# Patient Record
Sex: MALE | Race: WHITE | NOT HISPANIC OR LATINO | Employment: OTHER | URBAN - METROPOLITAN AREA
[De-identification: names, ages, dates, MRNs, and addresses within clinical notes are randomized per-mention and may not be internally consistent; named-entity substitution may affect disease eponyms.]

---

## 2017-01-03 ENCOUNTER — TELEPHONE (OUTPATIENT)
Dept: UROLOGY | Facility: CLINIC | Age: 72
End: 2017-01-03

## 2017-01-03 DIAGNOSIS — R97.20 ELEVATED PROSTATE SPECIFIC ANTIGEN (PSA): Primary | ICD-10-CM

## 2017-01-03 RX ORDER — FINASTERIDE 5 MG/1
5 TABLET, FILM COATED ORAL DAILY
Qty: 90 TABLET | Refills: 2 | Status: SHIPPED | OUTPATIENT
Start: 2017-01-03 | End: 2017-09-25

## 2017-01-03 NOTE — TELEPHONE ENCOUNTER
i sent an erx to [facility] in [place] on [address] for finasteride 5mg #90 with 2 refills and i wrote in the notes that pt will have to see md for further refills.  FRANK Lubin, CMA

## 2017-05-09 DIAGNOSIS — R97.20 ELEVATED PROSTATE SPECIFIC ANTIGEN (PSA): Primary | ICD-10-CM

## 2017-05-10 ENCOUNTER — OFFICE VISIT (OUTPATIENT)
Dept: UROLOGY | Facility: CLINIC | Age: 72
End: 2017-05-10
Payer: COMMERCIAL

## 2017-05-10 VITALS
WEIGHT: 230 LBS | HEART RATE: 68 BPM | HEIGHT: 69 IN | DIASTOLIC BLOOD PRESSURE: 74 MMHG | SYSTOLIC BLOOD PRESSURE: 120 MMHG | BODY MASS INDEX: 34.07 KG/M2

## 2017-05-10 DIAGNOSIS — R97.20 ELEVATED PROSTATE SPECIFIC ANTIGEN (PSA): ICD-10-CM

## 2017-05-10 DIAGNOSIS — N40.1 BENIGN PROSTATIC HYPERPLASIA WITH LOWER URINARY TRACT SYMPTOMS, UNSPECIFIED MORPHOLOGY: Primary | ICD-10-CM

## 2017-05-10 DIAGNOSIS — N40.0 BPH (BENIGN PROSTATIC HYPERTROPHY): Primary | ICD-10-CM

## 2017-05-10 LAB
ALBUMIN UR-MCNC: ABNORMAL MG/DL
APPEARANCE UR: CLEAR
BILIRUB UR QL STRIP: NEGATIVE
COLOR UR AUTO: YELLOW
GLUCOSE UR STRIP-MCNC: NEGATIVE MG/DL
HGB UR QL STRIP: NEGATIVE
KETONES UR STRIP-MCNC: NEGATIVE MG/DL
LEUKOCYTE ESTERASE UR QL STRIP: NEGATIVE
NITRATE UR QL: NEGATIVE
PH UR STRIP: 5 PH (ref 5–7)
PSA SERPL-MCNC: 3.3 NG/ML (ref 0–4)
RESIDUAL VOLUME (RV) (EXTERNAL): 20
SP GR UR STRIP: 1.02 (ref 1–1.03)
URN SPEC COLLECT METH UR: ABNORMAL
UROBILINOGEN UR STRIP-ACNC: 0.2 EU/DL (ref 0.2–1)

## 2017-05-10 PROCEDURE — 99212 OFFICE O/P EST SF 10 MIN: CPT | Mod: 25 | Performed by: UROLOGY

## 2017-05-10 PROCEDURE — 81003 URINALYSIS AUTO W/O SCOPE: CPT | Performed by: UROLOGY

## 2017-05-10 PROCEDURE — 51798 US URINE CAPACITY MEASURE: CPT | Performed by: UROLOGY

## 2017-05-10 PROCEDURE — 84153 ASSAY OF PSA TOTAL: CPT | Performed by: UROLOGY

## 2017-05-10 PROCEDURE — 36415 COLL VENOUS BLD VENIPUNCTURE: CPT | Performed by: UROLOGY

## 2017-05-10 RX ORDER — OMEGA-3 FATTY ACIDS/FISH OIL 300-1000MG
CAPSULE ORAL
COMMUNITY
Start: 2014-11-21 | End: 2017-05-10

## 2017-05-10 ASSESSMENT — PAIN SCALES - GENERAL: PAINLEVEL: MODERATE PAIN (4)

## 2017-05-10 NOTE — MR AVS SNAPSHOT
After Visit Summary   5/10/2017    Monster Olmedo    MRN: [id number]           Patient Information     Date Of Birth          1945        Visit Information        Provider Department      5/10/2017 9:20 AM Bright Mtz MD [facility] [place]        Today's Diagnoses     Benign prostatic hyperplasia with lower urinary tract symptoms, unspecified morphology    -  1    Elevated prostate specific antigen (PSA)           Follow-ups after your visit        Follow-up notes from your care team     Return in about 1 year (around 5/10/2018) for Physical Exam.      Who to contact     If you have questions or need follow up information about today's clinic visit or your schedule please contact [facility] [place] directly at [phone].  Normal or non-critical lab and imaging results will be communicated to you by Noknokerhart, letter or phone within 4 business days after the clinic has received the results. If you do not hear from us within 7 days, please contact the clinic through Noknokerhart or phone. If you have a critical or abnormal lab result, we will notify you by phone as soon as possible.  Submit refill requests through DeepRockDrive or call your pharmacy and they will forward the refill request to us. Please allow 3 business days for your refill to be completed.          Additional Information About Your Visit        MyChart Information     DeepRockDrive gives you secure access to your electronic health record. If you see a primary care provider, you can also send messages to your care team and make appointments. If you have questions, please call your primary care clinic.  If you do not have a primary care provider, please call [phone] and they will assist you.        Care EveryWhere ID     This is your Care EveryWhere ID. This could be used by other organizations to access your [place] medical records  [id number]        Your Vitals  "Were     Pulse Height BMI (Body Mass Index)             68 1.753 m (5' 9\") 33.97 kg/m2          Blood Pressure from Last 3 Encounters:   05/10/17 120/74   11/30/16 128/82   10/13/16 118/70    Weight from Last 3 Encounters:   05/10/17 104.3 kg (230 lb)   11/30/16 103.4 kg (228 lb)   10/13/16 102.5 kg (226 lb)              We Performed the Following     MEASURE POST-VOID RESIDUAL URINE/BLADDER CAPACITY, US NON-IMAGING (30853)     PSA Diag Urologic Phys [[id number]]        Primary Care Provider Office Phone # Fax #    Rico Cleveland [phone]       [facility] [address] [address]        Thank you!     Thank you for choosing [facility] [place]  for your care. Our goal is always to provide you with excellent care. Hearing back from our patients is one way we can continue to improve our services. Please take a few minutes to complete the written survey that you may receive in the mail after your visit with us. Thank you!             Your Updated Medication List - Protect others around you: Learn how to safely use, store and throw away your medicines at www.disposemymeds.org.          This list is accurate as of: 5/10/17  9:54 AM.  Always use your most recent med list.                   Brand Name Dispense Instructions for use    aspirin EC 81 MG EC tablet     90 tablet    Take 1 tablet (81 mg) by mouth daily       azelastine 0.1 % spray    ASTELIN     Spray 1 spray into both nostrils 2 times daily       clobetasol 0.05 % ointment    TEMOVATE     Apply topically 2 times daily       etanercept 25 MG vial injection kit    ENBREL     Inject 25 mg Subcutaneous once a week       finasteride 5 MG tablet    PROSCAR    90 tablet    Take 1 tablet (5 mg) by mouth daily       fish oil-omega-3 fatty acids 1000 MG capsule      Take 2 g by mouth 2 times daily       LEVITRA 20 MG tablet   Generic drug:  vardenafil      Take 20 mg by mouth daily as needed for erectile " dysfunction       metFORMIN 1000 MG tablet    GLUCOPHAGE     Take 1,000 mg by mouth 2 times daily (with meals)       Multiple vitamin Tabs      Take by mouth daily       order for DME      CPAP every night       ramipril 2.5 MG capsule    ALTACE     Take 2.5 mg by mouth daily       simvastatin 10 MG tablet    ZOCOR     Take 10 mg by mouth At Bedtime       tamsulosin 0.4 MG capsule    FLOMAX    90 capsule    Take 1 capsule (0.4 mg) by mouth daily

## 2017-05-10 NOTE — LETTER
5/10/2017      RE: Monster LIBRA Olmedo  [address]  [place] [address]       Monsterjose Olmedo is a pleasant 71-year-old male with BPH. He is currently on Flomax and finasteride and his PSA is 3.3. He had a normal MRI scan of the prostate a year ago. He would like to get off finasteride to improve his libido. Discussed effects and side effects of both drugs.  Other past medical history: Diabetes, atrial fibrillation, sleep apnea, erectile dysfunction, nonsmoker  Family history: No prostate cancer. Colon cancer  Medications: Baby aspirin, Astelin nasal spray, Temovate ointment, Enbrel, pro-scar, fish oil, Glucophage, multivitamin, Altase, Zocor, Flomax, Levitra  Allergies: Fenofibrate  Urinalysis: Normal, post void residual 20 cc             PSA: 3.3  Exam: Normal appearance, normal vital signs, alert and oriented, normocephalic, normal respirations, neuro grossly intact. Normal sphincter tone, no rectal mass or impaction, benign feeling prostate and seminal vesicles  Assessment: BPH  Plan: Discontinue finasteride, continue Flomax long-term. See me yearly for urinalysis, digital rectal exam. No further PSAs unless palpable abnormality    Bright Mtz MD

## 2017-05-10 NOTE — PROGRESS NOTES
Monster Olmedo is a pleasant 71-year-old male with BPH. He is currently on Flomax and finasteride and his PSA is 3.3. He had a normal MRI scan of the prostate a year ago. He would like to get off finasteride to improve his libido. Discussed effects and side effects of both drugs.  Other past medical history: Diabetes, atrial fibrillation, sleep apnea, erectile dysfunction, nonsmoker  Family history: No prostate cancer. Colon cancer  Medications: Baby aspirin, Astelin nasal spray, Temovate ointment, Enbrel, pro-scar, fish oil, Glucophage, multivitamin, Altase, Zocor, Flomax, Levitra  Allergies: Fenofibrate  Urinalysis: Normal, post void residual 20 cc             PSA: 3.3  Exam: Normal appearance, normal vital signs, alert and oriented, normocephalic, normal respirations, neuro grossly intact. Normal sphincter tone, no rectal mass or impaction, benign feeling prostate and seminal vesicles  Assessment: BPH  Plan: Discontinue finasteride, continue Flomax long-term. See me yearly for urinalysis, digital rectal exam. No further PSAs unless palpable abnormality

## 2017-05-11 DIAGNOSIS — N40.0 BPH (BENIGN PROSTATIC HYPERPLASIA): ICD-10-CM

## 2017-05-11 RX ORDER — TAMSULOSIN HYDROCHLORIDE 0.4 MG/1
0.4 CAPSULE ORAL DAILY
Qty: 90 CAPSULE | Refills: 3 | Status: SHIPPED | OUTPATIENT
Start: 2017-05-11 | End: 2017-07-24

## 2017-07-24 DIAGNOSIS — N40.0 BPH (BENIGN PROSTATIC HYPERPLASIA): ICD-10-CM

## 2017-07-24 RX ORDER — TAMSULOSIN HYDROCHLORIDE 0.4 MG/1
0.4 CAPSULE ORAL DAILY
Qty: 90 CAPSULE | Refills: 3 | Status: SHIPPED | OUTPATIENT
Start: 2017-07-24 | End: 2018-07-14

## 2017-09-25 DIAGNOSIS — R97.20 ELEVATED PROSTATE SPECIFIC ANTIGEN (PSA): ICD-10-CM

## 2017-09-25 RX ORDER — FINASTERIDE 5 MG/1
5 TABLET, FILM COATED ORAL DAILY
Qty: 90 TABLET | Refills: 3 | Status: SHIPPED | OUTPATIENT
Start: 2017-09-25 | End: 2021-06-30

## 2018-05-25 DIAGNOSIS — R97.20 ELEVATED PROSTATE SPECIFIC ANTIGEN (PSA): Primary | ICD-10-CM

## 2018-05-30 ENCOUNTER — OFFICE VISIT (OUTPATIENT)
Dept: UROLOGY | Facility: CLINIC | Age: 73
End: 2018-05-30
Payer: COMMERCIAL

## 2018-05-30 VITALS
BODY MASS INDEX: 33.47 KG/M2 | SYSTOLIC BLOOD PRESSURE: 142 MMHG | WEIGHT: 226 LBS | HEIGHT: 69 IN | DIASTOLIC BLOOD PRESSURE: 64 MMHG | OXYGEN SATURATION: 97 % | HEART RATE: 90 BPM

## 2018-05-30 DIAGNOSIS — R39.15 URINARY URGENCY: Primary | ICD-10-CM

## 2018-05-30 DIAGNOSIS — R97.20 ELEVATED PROSTATE SPECIFIC ANTIGEN (PSA): ICD-10-CM

## 2018-05-30 LAB
ALBUMIN UR-MCNC: ABNORMAL MG/DL
APPEARANCE UR: CLEAR
BILIRUB UR QL STRIP: NEGATIVE
COLOR UR AUTO: YELLOW
GLUCOSE UR STRIP-MCNC: 250 MG/DL
HGB UR QL STRIP: NEGATIVE
KETONES UR STRIP-MCNC: NEGATIVE MG/DL
LEUKOCYTE ESTERASE UR QL STRIP: NEGATIVE
NITRATE UR QL: NEGATIVE
PH UR STRIP: 5 PH (ref 5–7)
SOURCE: ABNORMAL
SP GR UR STRIP: >1.03 (ref 1–1.03)
UROBILINOGEN UR STRIP-ACNC: 0.2 EU/DL (ref 0.2–1)

## 2018-05-30 PROCEDURE — 51798 US URINE CAPACITY MEASURE: CPT | Performed by: UROLOGY

## 2018-05-30 PROCEDURE — 81003 URINALYSIS AUTO W/O SCOPE: CPT | Performed by: UROLOGY

## 2018-05-30 PROCEDURE — 99213 OFFICE O/P EST LOW 20 MIN: CPT | Mod: 25 | Performed by: UROLOGY

## 2018-05-30 RX ORDER — FLUTICASONE PROPIONATE 50 MCG
SPRAY, SUSPENSION (ML) NASAL
Refills: 5 | COMMUNITY
Start: 2018-03-05

## 2018-05-30 RX ORDER — RAMIPRIL 5 MG/1
CAPSULE ORAL
Refills: 3 | COMMUNITY
Start: 2018-04-17 | End: 2021-06-30

## 2018-05-30 RX ORDER — SILDENAFIL CITRATE 20 MG/1
TABLET ORAL
Status: ON HOLD | COMMUNITY
Start: 2018-01-18 | End: 2022-08-17

## 2018-05-30 RX ORDER — BETAMETHASONE DIPROPIONATE 0.5 MG/G
CREAM TOPICAL
Refills: 0 | COMMUNITY
Start: 2017-10-17

## 2018-05-30 ASSESSMENT — PAIN SCALES - GENERAL: PAINLEVEL: NO PAIN (0)

## 2018-05-30 NOTE — PROGRESS NOTES
Monster is a 72-year-old male with BPH. Patient has been evaluated and has no evidence of prostate cancer. He has a normal urinalysis and a 60 cc postvoid residual. There is no family history of prostate cancer.  Review of systems: No dysuria or hematuria. He has urgency, rare urge incontinence-only drops, post void dribbling.  Drinks a lot of coffee in the morning, some chocolate, occasional alcohol-notices frequency after each  Other past medical history: Diabetes, atrial fibrillation, sleep apnea, nonsmoker  Medications: Low-dose aspirin, Astelin nasal spray, 2% cream, Temovate ointment, Enbrel, Proscar, fish oil, Flonase spray, metformin, multivitamin, all TACE, sildenafil, Zocor, Flomax, Levitra  Allergies: Fenofibrate  Exam: Normal appearance, normal vital signs. Alert and oriented, normocephalic, normal respirations, neuro grossly intact. Normal sphincter tone, no rectal mass or impaction, benign feeling prostate, normal seminal vesicles. Abdomen obese, soft and nontender  Assessment: Urinary frequency and urgency discussed. Patient is voiding comfortably and can stop finasteride. Continue Flomax long-term. Discussed effects of caffeine, alcohol and chocolate on kidneys and bladder  Plan: See me next year for HUYEN, urinalysis and postvoid residual

## 2018-05-30 NOTE — NURSING NOTE
Chief Complaint   Patient presents with     Clinic Care Coordination - Follow-up     Pt here for annual follow-up     Ashlyn Luong CMA

## 2018-05-30 NOTE — MR AVS SNAPSHOT
"              After Visit Summary   5/30/2018    Monster Olmedo    MRN: [id number]           Patient Information     Date Of Birth          1945        Visit Information        Provider Department      5/30/2018 11:20 AM Bright Mtz MD [facility] [place]        Today's Diagnoses     Urinary urgency    -  1       Follow-ups after your visit        Follow-up notes from your care team     Return in about 1 year (around 5/30/2019) for Physical Exam.      Who to contact     If you have questions or need follow up information about today's clinic visit or your schedule please contact [facility] directly at [phone].  Normal or non-critical lab and imaging results will be communicated to you by Vantrixhart, letter or phone within 4 business days after the clinic has received the results. If you do not hear from us within 7 days, please contact the clinic through Vantrixhart or phone. If you have a critical or abnormal lab result, we will notify you by phone as soon as possible.  Submit refill requests through [employer] or call your pharmacy and they will forward the refill request to us. Please allow 3 business days for your refill to be completed.          Additional Information About Your Visit        MyChart Information     [employer] gives you secure access to your electronic health record. If you see a primary care provider, you can also send messages to your care team and make appointments. If you have questions, please call your primary care clinic.  If you do not have a primary care provider, please call [phone] and they will assist you.        Care EveryWhere ID     This is your Care EveryWhere ID. This could be used by other organizations to access your [place] medical records  [id number]        Your Vitals Were     Pulse Height Pulse Oximetry BMI (Body Mass Index)          90 1.753 m (5' 9\") 97% 33.37 kg/m2         Blood " Pressure from Last 3 Encounters:   05/30/18 142/64   05/10/17 120/74   11/30/16 128/82    Weight from Last 3 Encounters:   05/30/18 102.5 kg (226 lb)   05/10/17 104.3 kg (230 lb)   11/30/16 103.4 kg (228 lb)              We Performed the Following     MEASURE POST-VOID RESIDUAL URINE/BLADDER CAPACITY, US NON-IMAGING (06977)        Primary Care Provider Office Phone # Fax #    Rico Cleveland [phone]       [facility] [address] [place] [address]        Equal Access to Services     [facility]: Hadii alexander Sorto, waaxda lujake, qaybta kaalmada flores, graciela adam . So [facility] [phone].    ATENCIÓN: Si habla español, tiene a marvin disposición servicios gratuitos de asistencia lingüística. [facility] [phone].    We comply with applicable federal civil rights laws and [place] laws. We do not discriminate on the basis of race, color, national origin, age, disability, sex, sexual orientation, or gender identity.            Thank you!     Thank you for choosing [facility] [place]  for your care. Our goal is always to provide you with excellent care. Hearing back from our patients is one way we can continue to improve our services. Please take a few minutes to complete the written survey that you may receive in the mail after your visit with us. Thank you!             Your Updated Medication List - Protect others around you: Learn how to safely use, store and throw away your medicines at www.disposemymeds.org.          This list is accurate as of 5/30/18 12:02 PM.  Always use your most recent med list.                   Brand Name Dispense Instructions for use Diagnosis    aspirin 81 MG EC tablet     90 tablet    Take 1 tablet (81 mg) by mouth daily    Near syncope       azelastine 0.1 % spray    ASTELIN     Spray 1 spray into both nostrils 2 times daily        betamethasone dipropionate 0.05 % cream    DIPROSONE      APPLY TOPICALLY 2 TIMES EVERY DAY A THIN LAYER TO THE AFFECTED AREA(S)        clobetasol 0.05 % ointment    TEMOVATE     Apply topically 2 times daily        etanercept 25 MG vial injection kit    ENBREL     Inject 25 mg Subcutaneous once a week        finasteride 5 MG tablet    PROSCAR    90 tablet    Take 1 tablet (5 mg) by mouth daily    Elevated prostate specific antigen (PSA)       fish oil-omega-3 fatty acids 1000 MG capsule      Take 2 g by mouth 2 times daily        fluticasone 50 MCG/ACT spray    FLONASE     INHALE 1 (50MCG) BY INTRANASAL ROUTE EVERY DAY IN EACH NOSTRIL        LEVITRA 20 MG tablet   Generic drug:  vardenafil      Take 20 mg by mouth daily as needed for erectile dysfunction        metFORMIN 1000 MG tablet    GLUCOPHAGE     Take 1,000 mg by mouth 2 times daily (with meals)        Multiple vitamin Tabs      Take by mouth daily        order for DME      CPAP every night        * ramipril 2.5 MG capsule    ALTACE     Take 2.5 mg by mouth daily        * ramipril 5 MG capsule    ALTACE     TAKE 1 CAPSULE BY MOUTH ONCE DAILY.        sildenafil 20 MG tablet    REVATIO     Take 2 tabs 1 hr before sexual intercourse.        simvastatin 10 MG tablet    ZOCOR     Take 10 mg by mouth At Bedtime        tamsulosin 0.4 MG capsule    FLOMAX    90 capsule    Take 1 capsule (0.4 mg) by mouth daily    BPH (benign prostatic hyperplasia)       * Notice:  This list has 2 medication(s) that are the same as other medications prescribed for you. Read the directions carefully, and ask your doctor or other care provider to review them with you.

## 2018-05-30 NOTE — LETTER
5/30/2018     RE: Monster Olmedo  [address]  [place] [address]     Dear Colleague,    Thank you for referring your patient, Monster Olmedo, to the [facility] [place] at [facility]. Please see a copy of my visit note below.    Monster is a 72-year-old male with BPH. Patient has been evaluated and has no evidence of prostate cancer. He has a normal urinalysis and a 60 cc postvoid residual. There is no family history of prostate cancer.  Review of systems: No dysuria or hematuria. He has urgency, rare urge incontinence-only drops, post void dribbling.  Drinks a lot of coffee in the morning, some chocolate, occasional alcohol-notices frequency after each  Other past medical history: Diabetes, atrial fibrillation, sleep apnea, nonsmoker  Medications: Low-dose aspirin, Astelin nasal spray, 2% cream, Temovate ointment, Enbrel, Proscar, fish oil, Flonase spray, metformin, multivitamin, all TACE, sildenafil, Zocor, Flomax, Levitra  Allergies: Fenofibrate  Exam: Normal appearance, normal vital signs. Alert and oriented, normocephalic, normal respirations, neuro grossly intact. Normal sphincter tone, no rectal mass or impaction, benign feeling prostate, normal seminal vesicles. Abdomen obese, soft and nontender  Assessment: Urinary frequency and urgency discussed. Patient is voiding comfortably and can stop finasteride. Continue Flomax long-term. Discussed effects of caffeine, alcohol and chocolate on kidneys and bladder  Plan: See me next year for HUYEN, urinalysis and postvoid residual    Again, thank you for allowing me to participate in the care of your patient.      Sincerely,    Bright Mtz MD

## 2018-06-11 ENCOUNTER — HOSPITAL ENCOUNTER (EMERGENCY)
Facility: CLINIC | Age: 73
Discharge: HOME | End: 2018-06-11
Attending: EMERGENCY MEDICINE | Admitting: EMERGENCY MEDICINE
Payer: MEDICARE

## 2018-06-11 ENCOUNTER — APPOINTMENT (OUTPATIENT)
Dept: CT IMAGING | Facility: CLINIC | Age: 73
End: 2018-06-11
Attending: EMERGENCY MEDICINE
Payer: MEDICARE

## 2018-06-11 VITALS
HEIGHT: 69 IN | WEIGHT: 230 LBS | BODY MASS INDEX: 34.07 KG/M2 | SYSTOLIC BLOOD PRESSURE: 130 MMHG | TEMPERATURE: 97.6 F | OXYGEN SATURATION: 98 % | DIASTOLIC BLOOD PRESSURE: 80 MMHG | RESPIRATION RATE: 9 BRPM

## 2018-06-11 DIAGNOSIS — K80.50 BILIARY COLIC: ICD-10-CM

## 2018-06-11 LAB
ALBUMIN SERPL-MCNC: 4 G/DL (ref 3.4–5)
ALBUMIN UR-MCNC: 10 MG/DL
ALP SERPL-CCNC: 73 U/L (ref 40–150)
ALT SERPL W P-5'-P-CCNC: 49 U/L (ref 0–70)
ANION GAP SERPL CALCULATED.3IONS-SCNC: 6 MMOL/L (ref 3–14)
APPEARANCE UR: CLEAR
AST SERPL W P-5'-P-CCNC: 25 U/L (ref 0–45)
BASOPHILS # BLD AUTO: 0 10E9/L (ref 0–0.2)
BASOPHILS NFR BLD AUTO: 0.4 %
BILIRUB SERPL-MCNC: 0.8 MG/DL (ref 0.2–1.3)
BILIRUB UR QL STRIP: NEGATIVE
BUN SERPL-MCNC: 18 MG/DL (ref 7–30)
CALCIUM SERPL-MCNC: 9.2 MG/DL (ref 8.5–10.1)
CHLORIDE SERPL-SCNC: 103 MMOL/L (ref 94–109)
CO2 SERPL-SCNC: 32 MMOL/L (ref 20–32)
COLOR UR AUTO: ABNORMAL
CREAT BLD-MCNC: 0.9 MG/DL (ref 0.66–1.25)
CREAT SERPL-MCNC: 0.9 MG/DL (ref 0.66–1.25)
DIFFERENTIAL METHOD BLD: NORMAL
EOSINOPHIL # BLD AUTO: 0.2 10E9/L (ref 0–0.7)
EOSINOPHIL NFR BLD AUTO: 3 %
ERYTHROCYTE [DISTWIDTH] IN BLOOD BY AUTOMATED COUNT: 12.7 % (ref 10–15)
GFR SERPL CREATININE-BSD FRML MDRD: 83 ML/MIN/1.7M2
GFR SERPL CREATININE-BSD FRML MDRD: 83 ML/MIN/1.7M2
GLUCOSE SERPL-MCNC: 163 MG/DL (ref 70–99)
GLUCOSE UR STRIP-MCNC: NEGATIVE MG/DL
HCT VFR BLD AUTO: 43.1 % (ref 40–53)
HGB BLD-MCNC: 15.1 G/DL (ref 13.3–17.7)
HGB UR QL STRIP: NEGATIVE
IMM GRANULOCYTES # BLD: 0 10E9/L (ref 0–0.4)
IMM GRANULOCYTES NFR BLD: 0.4 %
INTERPRETATION ECG - MUSE: NORMAL
KETONES UR STRIP-MCNC: NEGATIVE MG/DL
LEUKOCYTE ESTERASE UR QL STRIP: NEGATIVE
LIPASE SERPL-CCNC: 199 U/L (ref 73–393)
LYMPHOCYTES # BLD AUTO: 3.3 10E9/L (ref 0.8–5.3)
LYMPHOCYTES NFR BLD AUTO: 42.7 %
MCH RBC QN AUTO: 31 PG (ref 26.5–33)
MCHC RBC AUTO-ENTMCNC: 35 G/DL (ref 31.5–36.5)
MCV RBC AUTO: 89 FL (ref 78–100)
MONOCYTES # BLD AUTO: 1 10E9/L (ref 0–1.3)
MONOCYTES NFR BLD AUTO: 13.4 %
NEUTROPHILS # BLD AUTO: 3.1 10E9/L (ref 1.6–8.3)
NEUTROPHILS NFR BLD AUTO: 40.1 %
NITRATE UR QL: NEGATIVE
NRBC # BLD AUTO: 0 10*3/UL
NRBC BLD AUTO-RTO: 0 /100
PH UR STRIP: 6 PH (ref 5–7)
PLATELET # BLD AUTO: 216 10E9/L (ref 150–450)
POTASSIUM SERPL-SCNC: 3.7 MMOL/L (ref 3.4–5.3)
PROT SERPL-MCNC: 7.7 G/DL (ref 6.8–8.8)
RBC # BLD AUTO: 4.87 10E12/L (ref 4.4–5.9)
RBC #/AREA URNS AUTO: <1 /HPF (ref 0–2)
SODIUM SERPL-SCNC: 141 MMOL/L (ref 133–144)
SOURCE: ABNORMAL
SP GR UR STRIP: 1.01 (ref 1–1.03)
SQUAMOUS #/AREA URNS AUTO: <1 /HPF (ref 0–1)
TROPONIN I SERPL-MCNC: <0.015 UG/L (ref 0–0.04)
UROBILINOGEN UR STRIP-MCNC: NORMAL MG/DL (ref 0–2)
WBC # BLD AUTO: 7.7 10E9/L (ref 4–11)
WBC #/AREA URNS AUTO: 0 /HPF (ref 0–5)

## 2018-06-11 PROCEDURE — 25000125 ZZHC RX 250: Performed by: EMERGENCY MEDICINE

## 2018-06-11 PROCEDURE — 74177 CT ABD & PELVIS W/CONTRAST: CPT

## 2018-06-11 PROCEDURE — 96374 THER/PROPH/DIAG INJ IV PUSH: CPT | Mod: 59

## 2018-06-11 PROCEDURE — 82565 ASSAY OF CREATININE: CPT

## 2018-06-11 PROCEDURE — 25000128 H RX IP 250 OP 636: Performed by: EMERGENCY MEDICINE

## 2018-06-11 PROCEDURE — 85025 COMPLETE CBC W/AUTO DIFF WBC: CPT | Performed by: EMERGENCY MEDICINE

## 2018-06-11 PROCEDURE — 93005 ELECTROCARDIOGRAM TRACING: CPT

## 2018-06-11 PROCEDURE — 83690 ASSAY OF LIPASE: CPT | Performed by: EMERGENCY MEDICINE

## 2018-06-11 PROCEDURE — 96361 HYDRATE IV INFUSION ADD-ON: CPT

## 2018-06-11 PROCEDURE — 84484 ASSAY OF TROPONIN QUANT: CPT | Performed by: EMERGENCY MEDICINE

## 2018-06-11 PROCEDURE — 25000132 ZZH RX MED GY IP 250 OP 250 PS 637: Performed by: EMERGENCY MEDICINE

## 2018-06-11 PROCEDURE — 99285 EMERGENCY DEPT VISIT HI MDM: CPT | Mod: 25

## 2018-06-11 PROCEDURE — 80053 COMPREHEN METABOLIC PANEL: CPT | Performed by: EMERGENCY MEDICINE

## 2018-06-11 PROCEDURE — 96375 TX/PRO/DX INJ NEW DRUG ADDON: CPT

## 2018-06-11 PROCEDURE — A9270 NON-COVERED ITEM OR SERVICE: HCPCS | Mod: GY | Performed by: EMERGENCY MEDICINE

## 2018-06-11 PROCEDURE — 81001 URINALYSIS AUTO W/SCOPE: CPT | Performed by: EMERGENCY MEDICINE

## 2018-06-11 RX ORDER — ONDANSETRON 4 MG/1
4 TABLET, ORALLY DISINTEGRATING ORAL EVERY 6 HOURS PRN
Qty: 15 TABLET | Refills: 0 | Status: SHIPPED | OUTPATIENT
Start: 2018-06-11 | End: 2021-06-30

## 2018-06-11 RX ORDER — IOPAMIDOL 755 MG/ML
115 INJECTION, SOLUTION INTRAVASCULAR ONCE
Status: COMPLETED | OUTPATIENT
Start: 2018-06-11 | End: 2018-06-11

## 2018-06-11 RX ORDER — OXYCODONE HYDROCHLORIDE 5 MG/1
5 TABLET ORAL ONCE
Status: COMPLETED | OUTPATIENT
Start: 2018-06-11 | End: 2018-06-11

## 2018-06-11 RX ORDER — ONDANSETRON 2 MG/ML
4 INJECTION INTRAMUSCULAR; INTRAVENOUS
Status: DISCONTINUED | OUTPATIENT
Start: 2018-06-11 | End: 2018-06-11 | Stop reason: HOSPADM

## 2018-06-11 RX ORDER — HYDROMORPHONE HYDROCHLORIDE 1 MG/ML
0.5 INJECTION, SOLUTION INTRAMUSCULAR; INTRAVENOUS; SUBCUTANEOUS
Status: DISCONTINUED | OUTPATIENT
Start: 2018-06-11 | End: 2018-06-11 | Stop reason: HOSPADM

## 2018-06-11 RX ORDER — OXYCODONE HYDROCHLORIDE 5 MG/1
5 TABLET ORAL EVERY 6 HOURS PRN
Qty: 10 TABLET | Refills: 0 | Status: SHIPPED | OUTPATIENT
Start: 2018-06-11 | End: 2021-06-30

## 2018-06-11 RX ADMIN — SODIUM CHLORIDE 75 ML: 9 INJECTION, SOLUTION INTRAVENOUS at 03:09

## 2018-06-11 RX ADMIN — LIDOCAINE HYDROCHLORIDE 30 ML: 20 SOLUTION ORAL; TOPICAL at 04:06

## 2018-06-11 RX ADMIN — OXYCODONE HYDROCHLORIDE 5 MG: 5 TABLET ORAL at 04:53

## 2018-06-11 RX ADMIN — ONDANSETRON 4 MG: 2 INJECTION INTRAMUSCULAR; INTRAVENOUS at 03:23

## 2018-06-11 RX ADMIN — SODIUM CHLORIDE 1000 ML: 9 INJECTION, SOLUTION INTRAVENOUS at 03:19

## 2018-06-11 RX ADMIN — IOPAMIDOL 115 ML: 755 INJECTION, SOLUTION INTRAVENOUS at 03:09

## 2018-06-11 RX ADMIN — HYDROMORPHONE HYDROCHLORIDE 0.5 MG: 1 INJECTION, SOLUTION INTRAMUSCULAR; INTRAVENOUS; SUBCUTANEOUS at 03:19

## 2018-06-11 NOTE — ED PROVIDER NOTES
History     Chief Complaint:  Abdominal Pain     HPI   Monster Olmedo is a 72 year old male who presents for evaluation of upper abdominal pain.  He was feeling fine when he went to bed around 11 PM, though about a half hour later while still awake he noticed the onset of bilateral upper abdominal pain with slight radiation to his back.  No nausea, vomiting, or changes in bowel movements recently.  No urinary symptoms.  No history of abdominal surgeries.  He is not on blood thinners though does have a history of paroxysmal atrial fibrillation.  His history of non-insulin-dependent diabetes.  No recent fevers or abdominal trauma.  No chest pain, trouble breathing, or cough.  He thinks the problem is in his abdomen and not his chest.  No lateralizing symptoms.  No history of gallbladder problems..    Allergies:  Fenofibrate     Medications:      ondansetron (ZOFRAN ODT) 4 MG ODT tab   oxyCODONE IR (ROXICODONE) 5 MG tablet   aspirin EC 81 MG tablet   azelastine (ASTELIN) 0.1 % nasal spray   betamethasone dipropionate (DIPROSONE) 0.05 % cream   clobetasol (TEMOVATE) 0.05 % ointment   etanercept (ENBREL) 25 MG vial injection kit   finasteride (PROSCAR) 5 MG tablet   fish oil-omega-3 fatty acids 1000 MG capsule   fluticasone (FLONASE) 50 MCG/ACT spray   metFORMIN (GLUCOPHAGE) 1000 MG tablet   Multiple vitamin TABS   order for DME   ramipril (ALTACE) 2.5 MG capsule   ramipril (ALTACE) 5 MG capsule   sildenafil (REVATIO) 20 MG tablet   simvastatin (ZOCOR) 10 MG tablet   tamsulosin (FLOMAX) 0.4 MG capsule   vardenafil (LEVITRA) 20 MG tablet       Past Medical History:    Past Medical History:   Diagnosis Date     Diabetes (H)      Paroxysmal atrial fibrillation (H) 9/23/16     Sleep apnea        There are no active problems to display for this patient.       Past Surgical History:    Past Surgical History:   Procedure Laterality Date     HAND SURGERY Left      PROSTATE SURGERY          Family History:    family history  "includes CEREBROVASCULAR DISEASE in his mother; Familial Adenomatous Polyposis (FAP) in his father; Unknown/Adopted in his maternal grandfather, maternal grandmother, paternal grandfather, and paternal grandmother.    Social History:   reports that he has never smoked. He has never used smokeless tobacco. He reports that he drinks alcohol. He reports that he does not use illicit drugs.    PCP: Rico Cleveland     Review of Systems   All other systems reviewed and are negative.    Physical Exam     Patient Vitals for the past 24 hrs:   BP Temp Temp src Heart Rate Resp SpO2 Height Weight   06/11/18 0526 130/80 - - 76 - 98 % - -   06/11/18 0345 - - - 64 9 97 % - -   06/11/18 0330 138/76 - - 59 (!) 7 95 % - -   06/11/18 0240 161/69 97.6  F (36.4  C) Oral 63 16 98 % 1.753 m (5' 9\") 104.3 kg (230 lb)        Physical Exam  General: Male sitting upright in room 24, female  at bedside  HENT: mucous membranes moist   CV: regular rate, regular rhythm, no murmur audible, no abnormal abdominal pulsations palpable though exam limited somewhat by habitus, no LE edema, normal radial pulses, all 4 extremities appropriately perfused  Resp: clear throughout, normal effort, no crackles or wheezing  GI: abdomen soft, protuberant, moderate tenderness mostly in bilateral upper quadrants, negative Russ sign, no discrete masses palpable, no focal tenderness  MSK: no bony tenderness, no CVAT  Skin: appropriately warm and dry, no abdominal/flank/back rash or ecchymosis  Neuro: alert, clear speech, oriented  Psych: normal mood and affect    Emergency Department Course     ECG (02:45:41):  Rate 64 bpm.   QT/QTc 396.   QRS 94  Sinus rhythm with first-degree AV block, possible prior septal infarct.  interpreted at 0303 by Handy Lynch MD.     Imaging:    CT Abdomen Pelvis w Contrast   Preliminary Result   IMPRESSION:    1. No cause of acute pain identified in the abdomen or pelvis.   2. Cholelithiasis.            All imaging " results were discussed with the  who voiced understanding of the findings.    Laboratory:    Labs Ordered and Resulted from Time of ED Arrival Up to the Time of Departure from the ED   COMPREHENSIVE METABOLIC PANEL - Abnormal; Notable for the following:        Result Value    Glucose 163 (*)     All other components within normal limits   ROUTINE UA WITH MICROSCOPIC - Abnormal; Notable for the following:     Protein Albumin Urine 10 (*)     All other components within normal limits   CBC WITH PLATELETS DIFFERENTIAL   LIPASE   TROPONIN I   CREATININE POCT   PERIPHERAL IV CATHETER   CARDIAC CONTINUOUS MONITORING   PULSE OXIMETRY NURSING   ISTAT CREATININE NURSING POCT        Interventions:    Medications   HYDROmorphone (PF) (DILAUDID) injection 0.5 mg (0.5 mg Intravenous Given 6/11/18 0319)   ondansetron (ZOFRAN) injection 4 mg (4 mg Intravenous Given 6/11/18 0323)   iopamidol (ISOVUE-370) solution 115 mL (115 mLs Intravenous Given 6/11/18 0309)   CT scan flush (75 mLs Intravenous Given 6/11/18 0309)   0.9% sodium chloride BOLUS (0 mLs Intravenous Stopped 6/11/18 0523)   lidocaine (viscous) (XYLOCAINE) 2 % 15 mL, alum & mag hydroxide-simethicone (MYLANTA ES/MAALOX  ES) 15 mL GI Cocktail (30 mLs Oral Given 6/11/18 0406)   oxyCODONE IR (ROXICODONE) tablet 5 mg (5 mg Oral Given 6/11/18 0453)        Emergency Department Course:  Past medical records, nursing notes, and vitals reviewed.  I performed an exam of the patient and obtained history, as documented above.    I performed electronic chart review in EPIC.    I rechecked the patient at 04 43 and then again just prior to discharge.  Serial abdominal exams remain benign.  Symptoms resolved.  He passed oral challenge.. Findings and plan explained to the Patient and . Patient was discharged.    Impression & Plan      Medical Decision Making:  I think his acute upper abdominal pain is most likely due to biliary colic.  In retrospect, he states dinner tonight was  "bigger than usual he may have had symptoms somewhat like this in the past thogh less intense.  Alternate causes including bowel obstruction, pancreatitis, hepatitis, kidney stone, and referred pain from lower thoracic condition such as ACS and pulmonary embolism were all considered but I think they are ruled out to sufficient degree that discharge was reasonable.  I do not think he requires dedicated gallbladder ultrasound.  Symptoms improved and he is passing oral challenge.  I offered hospitalization for symptom control which he declined.  Likely need for eventual surgery to address his gallstones was discussed and referral information for our general surgery group was provided.  I discussed potential side effects of medications prescribed including drowsiness and constipation.  Specific work return precautions for high fevers, unbearable pain, prolonged vomiting, or other concerns were reviewed.  Dietary guidance provided as well to minimize the risk of recurrent attacks.  He was discharged home in improved condition.      Diagnosis:    ICD-10-CM    1. Biliary colic K80.50         Discharge Medications:  Discharge Medication List as of 6/11/2018  5:24 AM      START taking these medications    Details   ondansetron (ZOFRAN ODT) 4 MG ODT tab Take 1 tablet (4 mg) by mouth every 6 hours as needed for nausea, Disp-15 tablet, R-0, Local Print      oxyCODONE IR (ROXICODONE) 5 MG tablet Take 1 tablet (5 mg) by mouth every 6 hours as needed for severe pain, Disp-10 tablet, R-0, Local Print              This record was created at least in part using electronic voice recognition software, so please excuse any \"typos.\"    6/11/2018   Handy Lynch, *        Handy Lynch MD  06/11/18 0544    "

## 2018-06-11 NOTE — DISCHARGE INSTRUCTIONS
Gallstones with Biliary Colic    You have abdominal pain due to irritation and spasm of the gallbladder. This is called biliary colic. The gallbladder is a small sac under the liver, which stores and releases a fluid that aids in the digestion of fat. A collection of crystals may form stones inside the gallbladder (gallstones). Gallstones can cause the gallbladder to spasm. If they block the duct out of the gallbladder, they can cause pain and even an infection.   A number of factors increase the risk for having gallstones:    Being female    Being severely overweight (obese)    Older age    Losing or gaining weight quickly    Eating a high-calorie diet    Being pregnant    Taking hormone therapy    Having diabetes  Home care    Rest in bed.    Drink only clear liquids until you feel better.    You may have been prescribed medicine for pain or nausea. Take these as directed.    Fat in your diet makes the gallbladder contract and may cause increased pain. Avoid foods that are high in fat (such as full-fat dairy, fried foods, and fatty meats) for at least two days.    If you are overweight, talk to your healthcare provider about losing weight.  Follow-up care  Follow up with your healthcare provider or as advised. There is a chance that you will have another episode of pain from your gallstones at some point. Removal of the gallbladder is an option to prevent this. Talk with your healthcare provider about your treatment options.  When to seek medical advice  Call your healthcare provider if any of the following occur:    Worsening pain or pain lasting for longer than 6 hours    Pain moving to the right lower abdomen    Repeated vomiting    Swollen abdomen    Fever of 100.4 F (38 C) or higher, or as directed by your healthcare provider    Very dark urine, light colored stools, or yellow color of the skin or eyes    Chest, arm, back, neck or jaw pain  Date Last Reviewed: 6/18/2015 2000-2017 [employer] [address] [place] [address]. All rights reserved. This information is not intended as a substitute for professional medical care. Always follow your healthcare professional's instructions.

## 2018-06-11 NOTE — ED AVS SNAPSHOT
Emergency Department    [address] [place] [address]    Phone:  [phone]    Fax:  [phone]                                       Monster LIBRA Briannasteve   MRN: [id number]    Department:   Emergency Department   Date of Visit:  6/11/2018           After Visit Summary Signature Page     I have received my discharge instructions, and my questions have been answered. I have discussed any challenges I see with this plan with the nurse or doctor.    ..........................................................................................................................................  Patient/Patient Representative Signature      ..........................................................................................................................................  Patient Representative Print Name and Relationship to Patient    ..................................................               ................................................  Date                                            Time    ..........................................................................................................................................  Reviewed by Signature/Title    ...................................................              ..............................................  Date                                                            Time

## 2018-06-11 NOTE — ED AVS SNAPSHOT
Emergency Department    [address] [facility] [address]    Phone:  [phone]    Fax:  [phone]                                       Monster LIBRA Olmedo   MRN: [id number]    Department:   Emergency Department   Date of Visit:  6/11/2018           Patient Information     Date Of Birth          1945        Your diagnoses for this visit were:     Biliary colic        You were seen by Handy Lynch MD.      Follow-up Information     Follow up with [facility]. Call today.    Why:  to make appt for recheck with General Surgeon to discuss possible gallbladder removal surgery    Contact information:    [address]  [facility] [address] [phone]        Follow up with Rico Cleveland MD. Call today.    Contact information:    [facility]  [address]  [facility] [id number] [phone]          Follow up with  Emergency Department.    Specialty:  EMERGENCY MEDICINE    Why:  As needed, If symptoms worsen    Contact information:    [address] [facility] [address] [phone]        Discharge Instructions         Gallstones with Biliary Colic    You have abdominal pain due to irritation and spasm of the gallbladder. This is called biliary colic. The gallbladder is a small sac under the liver, which stores and releases a fluid that aids in the digestion of fat. A collection of crystals may form stones inside the gallbladder (gallstones). Gallstones can cause the gallbladder to spasm. If they block the duct out of the gallbladder, they can cause pain and even an infection.   A number of factors increase the risk for having gallstones:    Being female    Being severely overweight (obese)    Older age    Losing or gaining weight quickly    Eating a high-calorie diet    Being pregnant    Taking hormone therapy    Having diabetes  Home care    Rest in bed.    Drink only clear liquids until you feel better.    You may  have been prescribed medicine for pain or nausea. Take these as directed.    Fat in your diet makes the gallbladder contract and may cause increased pain. Avoid foods that are high in fat (such as full-fat dairy, fried foods, and fatty meats) for at least two days.    If you are overweight, talk to your healthcare provider about losing weight.  Follow-up care  Follow up with your healthcare provider or as advised. There is a chance that you will have another episode of pain from your gallstones at some point. Removal of the gallbladder is an option to prevent this. Talk with your healthcare provider about your treatment options.  When to seek medical advice  Call your healthcare provider if any of the following occur:    Worsening pain or pain lasting for longer than 6 hours    Pain moving to the right lower abdomen    Repeated vomiting    Swollen abdomen    Fever of 100.4 F (38 C) or higher, or as directed by your healthcare provider    Very dark urine, light colored stools, or yellow color of the skin or eyes    Chest, arm, back, neck or jaw pain  Date Last Reviewed: 6/18/2015 2000-2017 [employer]. [address] [place] [address]. All rights reserved. This information is not intended as a substitute for professional medical care. Always follow your healthcare professional's instructions.          24 Hour Appointment Hotline       To make an appointment at any [facility] clinic, call [phone] ([phone]). If you don't have a family doctor or clinic, we will help you find one. [facility] clinics are conveniently located to serve the needs of you and your family.             Review of your medicines      START taking        Dose / Directions Last dose taken    ondansetron 4 MG ODT tab   Commonly known as:  ZOFRAN ODT   Dose:  4 mg   Quantity:  15 tablet        Take 1 tablet (4 mg) by mouth every 6 hours as needed for nausea   Refills:  0        oxyCODONE IR 5 MG tablet   Commonly  known as:  ROXICODONE   Dose:  5 mg   Quantity:  10 tablet        Take 1 tablet (5 mg) by mouth every 6 hours as needed for severe pain   Refills:  0          Our records show that you are taking the medicines listed below. If these are incorrect, please call your family doctor or clinic.        Dose / Directions Last dose taken    aspirin 81 MG EC tablet   Dose:  81 mg   Quantity:  90 tablet        Take 1 tablet (81 mg) by mouth daily   Refills:  0        azelastine 0.1 % spray   Commonly known as:  ASTELIN   Dose:  1 spray        Spray 1 spray into both nostrils 2 times daily   Refills:  0        betamethasone dipropionate 0.05 % cream   Commonly known as:  DIPROSONE        APPLY TOPICALLY 2 TIMES EVERY DAY A THIN LAYER TO THE AFFECTED AREA(S)   Refills:  0        clobetasol 0.05 % ointment   Commonly known as:  TEMOVATE        Apply topically 2 times daily   Refills:  0        etanercept 25 MG vial injection kit   Commonly known as:  ENBREL   Dose:  25 mg        Inject 25 mg Subcutaneous once a week   Refills:  0        finasteride 5 MG tablet   Commonly known as:  PROSCAR   Dose:  5 mg   Quantity:  90 tablet        Take 1 tablet (5 mg) by mouth daily   Refills:  3        fish oil-omega-3 fatty acids 1000 MG capsule   Dose:  2 g        Take 2 g by mouth 2 times daily   Refills:  0        fluticasone 50 MCG/ACT spray   Commonly known as:  FLONASE        INHALE 1 (50MCG) BY INTRANASAL ROUTE EVERY DAY IN EACH NOSTRIL   Refills:  5        LEVITRA 20 MG tablet   Dose:  20 mg   Generic drug:  vardenafil        Take 20 mg by mouth daily as needed for erectile dysfunction   Refills:  0        metFORMIN 1000 MG tablet   Commonly known as:  GLUCOPHAGE   Dose:  1000 mg        Take 1,000 mg by mouth 2 times daily (with meals)   Refills:  0        Multiple vitamin Tabs        Take by mouth daily   Refills:  0        order for DME        CPAP every night   Refills:  0        * ramipril 2.5 MG capsule   Commonly known as:   ALTACE   Dose:  2.5 mg        Take 2.5 mg by mouth daily   Refills:  0        * ramipril 5 MG capsule   Commonly known as:  ALTACE        TAKE 1 CAPSULE BY MOUTH ONCE DAILY.   Refills:  3        sildenafil 20 MG tablet   Commonly known as:  REVATIO        Take 2 tabs 1 hr before sexual intercourse.   Refills:  0        simvastatin 10 MG tablet   Commonly known as:  ZOCOR   Dose:  10 mg        Take 10 mg by mouth At Bedtime   Refills:  0        tamsulosin 0.4 MG capsule   Commonly known as:  FLOMAX   Dose:  0.4 mg   Quantity:  90 capsule        Take 1 capsule (0.4 mg) by mouth daily   Refills:  3        * Notice:  This list has 2 medication(s) that are the same as other medications prescribed for you. Read the directions carefully, and ask your doctor or other care provider to review them with you.            Information about OPIOIDS     PRESCRIPTION OPIOIDS: WHAT YOU NEED TO KNOW   You have a prescription for an opioid (narcotic) pain medicine. Opioids can cause addiction. If you have a history of chemical dependency of any type, you are at a higher risk of becoming addicted to opioids. Only take this medicine after all other options have been tried. Take it for as short a time and as few doses as possible.     Do not:    Drive. If you drive while taking these medicines, you could be arrested for driving under the influence (DUI).    Operate heavy machinery    Do any other dangerous activities while taking these medicines.     Drink any alcohol while taking these medicines.      Take with any other medicines that contain acetaminophen. Read all labels carefully. Look for the word  acetaminophen  or  Tylenol.  Ask your pharmacist if you have questions or are unsure.    Store your pills in a secure place, locked if possible. We will not replace any lost or stolen medicine. If you don t finish your medicine, please throw away (dispose) as directed by your pharmacist. The [place] [employer]ution [employer] has more  information about safe disposal: https://www.pca.[facility].[place].us/living-green/managing-unwanted-medications    All opioids tend to cause constipation. Drink plenty of water and eat foods that have a lot of fiber, such as fruits, vegetables, prune juice, apple juice and high-fiber cereal. Take a laxative (Miralax, milk of magnesia, Colace, Senna) if you don t move your bowels at least every other day.         Prescriptions were sent or printed at these locations (2 Prescriptions)                   Other Prescriptions                Printed at Department/Unit printer (2 of 2)         ondansetron (ZOFRAN ODT) 4 MG ODT tab               oxyCODONE IR (ROXICODONE) 5 MG tablet                Procedures and tests performed during your visit     CBC with platelets differential    CT Abdomen Pelvis w Contrast    Cardiac Continuous Monitoring    Comprehensive metabolic panel    Creatinine POCT    EKG 12-lead, tracing only    ISTAT creatinine nursing POCT    Lipase    Peripheral IV catheter    Pulse oximetry nursing    Troponin I    UA with Microscopic      Orders Needing Specimen Collection     None      Pending Results     Date and Time Order Name Status Description    6/11/2018 0244 CT Abdomen Pelvis w Contrast Preliminary             Pending Culture Results     No orders found from 6/9/2018 to 6/12/2018.            Pending Results Instructions     If you had any lab results that were not finalized at the time of your Discharge, you can call the ED Lab Result RN at [phone]. You will be contacted by this team for any positive Lab results or changes in treatment. The nurses are available 7 days a week from 10A to 6:30P.  You can leave a message 24 hours per day and they will return your call.        Test Results From Your Hospital Stay        6/11/2018  3:15 AM      Component Results     Component Value Ref Range & Units Status    WBC 7.7 4.0 - 11.0 10e9/L Final    RBC Count 4.87 4.4 - 5.9 10e12/L Final    Hemoglobin 15.1  13.3 - 17.7 g/dL Final    Hematocrit 43.1 40.0 - 53.0 % Final    MCV 89 78 - 100 fl Final    MCH 31.0 26.5 - 33.0 pg Final    MCHC 35.0 31.5 - 36.5 g/dL Final    RDW 12.7 10.0 - 15.0 % Final    Platelet Count 216 150 - 450 10e9/L Final    Diff Method Automated Method  Final    % Neutrophils 40.1 % Final    % Lymphocytes 42.7 % Final    % Monocytes 13.4 % Final    % Eosinophils 3.0 % Final    % Basophils 0.4 % Final    % Immature Granulocytes 0.4 % Final    Nucleated RBCs 0 0 /100 Final    Absolute Neutrophil 3.1 1.6 - 8.3 10e9/L Final    Absolute Lymphocytes 3.3 0.8 - 5.3 10e9/L Final    Absolute Monocytes 1.0 0.0 - 1.3 10e9/L Final    Absolute Eosinophils 0.2 0.0 - 0.7 10e9/L Final    Absolute Basophils 0.0 0.0 - 0.2 10e9/L Final    Abs Immature Granulocytes 0.0 0 - 0.4 10e9/L Final    Absolute Nucleated RBC 0.0  Final         6/11/2018  3:33 AM      Component Results     Component Value Ref Range & Units Status    Sodium 141 133 - 144 mmol/L Final    Potassium 3.7 3.4 - 5.3 mmol/L Final    Chloride 103 94 - 109 mmol/L Final    Carbon Dioxide 32 20 - 32 mmol/L Final    Anion Gap 6 3 - 14 mmol/L Final    Glucose 163 (H) 70 - 99 mg/dL Final    Urea Nitrogen 18 7 - 30 mg/dL Final    Creatinine 0.90 0.66 - 1.25 mg/dL Final    GFR Estimate 83 >60 mL/min/1.7m2 Final    Non  GFR Calc    GFR Estimate If Black >90 >60 mL/min/1.7m2 Final    African American GFR Calc    Calcium 9.2 8.5 - 10.1 mg/dL Final    Bilirubin Total 0.8 0.2 - 1.3 mg/dL Final    Albumin 4.0 3.4 - 5.0 g/dL Final    Protein Total 7.7 6.8 - 8.8 g/dL Final    Alkaline Phosphatase 73 40 - 150 U/L Final    ALT 49 0 - 70 U/L Final    AST 25 0 - 45 U/L Final         6/11/2018  3:28 AM      Component Results     Component Value Ref Range & Units Status    Lipase 199 73 - 393 U/L Final         6/11/2018  3:33 AM      Component Results     Component Value Ref Range & Units Status    Troponin I ES <0.015 0.000 - 0.045 ug/L Final    The 99th  percentile for upper reference range is 0.045 ug/L.  Troponin values   in the range of 0.045 - 0.120 ug/L may be associated with risks of adverse   clinical events.           6/11/2018  3:43 AM      Narrative     CT ABDOMEN AND PELVIS WITH CONTRAST   6/11/2018 3:15 AM     HISTORY: Acute upper abdominal pain radiating to the back.    COMPARISON: None.    TECHNIQUE: Following the uneventful administration of 115 mL  Isovue-370 intravenous contrast, helical sections were acquired from  the top of the diaphragm through the pubic symphysis. Coronal  reconstructions were generated. Radiation dose for this scan was  reduced using automated exposure control, adjustment of the mA and/or  kV according to the patient's size, or iterative reconstruction  technique.    FINDINGS    Abdomen: Mild diffuse fatty infiltration of the liver. The liver,  spleen, pancreas, adrenal glands and left kidney are unremarkable. 2  cm cyst in the inferior pole of the right kidney. Several gallstones  in the gallbladder, measuring up to 1.9 cm in diameter. No enlarged  lymph nodes or free fluid in the upper abdomen. Atherosclerotic  calcification in the abdominal aorta.    Scan through the lower chest is unremarkable.    Pelvis: The small and large bowel are normal in caliber. The appendix  is not definitely visualized. No bowel wall thickening, pneumatosis or  free intraperitoneal gas. Mild enlargement of the prostate gland.        Impression     IMPRESSION:   1. No cause of acute pain identified in the abdomen or pelvis.  2. Cholelithiasis.           6/11/2018  4:21 AM      Component Results     Component Value Ref Range & Units Status    Color Urine Light Yellow  Final    Appearance Urine Clear  Final    Glucose Urine Negative NEG^Negative mg/dL Final    Bilirubin Urine Negative NEG^Negative Final    Ketones Urine Negative NEG^Negative mg/dL Final    Specific Gravity Urine 1.010 1.003 - 1.035 Final    Blood Urine Negative NEG^Negative Final     pH Urine 6.0 5.0 - 7.0 pH Final    Protein Albumin Urine 10 (A) NEG^Negative mg/dL Final    Urobilinogen mg/dL Normal 0.0 - 2.0 mg/dL Final    Nitrite Urine Negative NEG^Negative Final    Leukocyte Esterase Urine Negative NEG^Negative Final    Source Midstream Urine  Final    WBC Urine 0 0 - 5 /HPF Final    RBC Urine <1 0 - 2 /HPF Final    Squamous Epithelial /HPF Urine <1 0 - 1 /HPF Final         6/11/2018  2:54 AM      Component Results     Component Value Ref Range & Units Status    Creatinine 0.9 0.66 - 1.25 mg/dL Final    GFR Estimate 83 >60 mL/min/1.7m2 Final    GFR Estimate If Black >90 >60 mL/min/1.7m2 Final                Clinical Quality Measure: Blood Pressure Screening     Your blood pressure was checked while you were in the emergency department today. The last reading we obtained was  BP: 138/76 . Please read the guidelines below about what these numbers mean and what you should do about them.  If your systolic blood pressure (the top number) is less than 120 and your diastolic blood pressure (the bottom number) is less than 80, then your blood pressure is normal. There is nothing more that you need to do about it.  If your systolic blood pressure (the top number) is 120-139 or your diastolic blood pressure (the bottom number) is 80-89, your blood pressure may be higher than it should be. You should have your blood pressure rechecked within a year by a primary care provider.  If your systolic blood pressure (the top number) is 140 or greater or your diastolic blood pressure (the bottom number) is 90 or greater, you may have high blood pressure. High blood pressure is treatable, but if left untreated over time it can put you at risk for heart attack, stroke, or kidney failure. You should have your blood pressure rechecked by a primary care provider within the next 4 weeks.  If your provider in the emergency department today gave you specific instructions to follow-up with your doctor or provider even  sooner than that, you should follow that instruction and not wait for up to 4 weeks for your follow-up visit.        Thank you for choosing [facility]       Thank you for choosing [facility] for your care. Our goal is always to provide you with excellent care. Hearing back from our patients is one way we can continue to improve our services. Please take a few minutes to complete the written survey that you may receive in the mail after you visit with us. Thank you!        [email or web address] Information     [employer] gives you secure access to your electronic health record. If you see a primary care provider, you can also send messages to your care team and make appointments. If you have questions, please call your primary care clinic.  If you do not have a primary care provider, please call [phone] and they will assist you.        Care EveryWhere ID     This is your Care EveryWhere ID. This could be used by other organizations to access your [facility] medical records  [id number]        Equal Access to Services     CAREY MCCLAIN : Trista Sorto, randi rios, niyah tanner, graciela adam . So [facility] [phone].    ATENCIÓN: Si habla español, tiene a marvin disposición servicios gratuitos de asistencia lingüística. Llame al [phone].    We comply with applicable federal civil rights laws and [place] laws. We do not discriminate on the basis of race, color, national origin, age, disability, sex, sexual orientation, or gender identity.            After Visit Summary       This is your record. Keep this with you and show to your community pharmacist(s) and doctor(s) at your next visit.

## 2018-07-14 DIAGNOSIS — N40.0 BPH (BENIGN PROSTATIC HYPERPLASIA): ICD-10-CM

## 2018-07-16 RX ORDER — TAMSULOSIN HYDROCHLORIDE 0.4 MG/1
CAPSULE ORAL
Qty: 90 CAPSULE | Refills: 3 | Status: SHIPPED | OUTPATIENT
Start: 2018-07-16 | End: 2021-06-30 | Stop reason: ALTCHOICE

## 2019-05-30 DIAGNOSIS — R39.15 URGENCY OF URINATION: Primary | ICD-10-CM

## 2019-05-31 ENCOUNTER — OFFICE VISIT (OUTPATIENT)
Dept: UROLOGY | Facility: CLINIC | Age: 74
End: 2019-05-31
Payer: COMMERCIAL

## 2019-05-31 VITALS
SYSTOLIC BLOOD PRESSURE: 128 MMHG | BODY MASS INDEX: 32.58 KG/M2 | WEIGHT: 220 LBS | HEART RATE: 80 BPM | HEIGHT: 69 IN | DIASTOLIC BLOOD PRESSURE: 74 MMHG

## 2019-05-31 DIAGNOSIS — R35.0 BENIGN PROSTATIC HYPERPLASIA WITH URINARY FREQUENCY: Primary | ICD-10-CM

## 2019-05-31 DIAGNOSIS — R39.15 URGENCY OF URINATION: ICD-10-CM

## 2019-05-31 DIAGNOSIS — N40.1 BENIGN PROSTATIC HYPERPLASIA WITH URINARY FREQUENCY: Primary | ICD-10-CM

## 2019-05-31 LAB
ALBUMIN UR-MCNC: NEGATIVE MG/DL
APPEARANCE UR: CLEAR
BILIRUB UR QL STRIP: NEGATIVE
COLOR UR AUTO: YELLOW
GLUCOSE UR STRIP-MCNC: >=1000 MG/DL
HGB UR QL STRIP: NEGATIVE
KETONES UR STRIP-MCNC: NEGATIVE MG/DL
LEUKOCYTE ESTERASE UR QL STRIP: NEGATIVE
NITRATE UR QL: NEGATIVE
PH UR STRIP: 5 PH (ref 5–7)
RESIDUAL VOLUME (RV) (EXTERNAL): 16
SOURCE: ABNORMAL
SP GR UR STRIP: 1.02 (ref 1–1.03)
UROBILINOGEN UR STRIP-ACNC: 0.2 EU/DL (ref 0.2–1)

## 2019-05-31 PROCEDURE — 99213 OFFICE O/P EST LOW 20 MIN: CPT | Mod: 25 | Performed by: UROLOGY

## 2019-05-31 PROCEDURE — 51798 US URINE CAPACITY MEASURE: CPT | Performed by: UROLOGY

## 2019-05-31 PROCEDURE — 81003 URINALYSIS AUTO W/O SCOPE: CPT | Performed by: UROLOGY

## 2019-05-31 RX ORDER — ATORVASTATIN CALCIUM 20 MG/1
TABLET, FILM COATED ORAL
Refills: 3 | COMMUNITY
Start: 2019-03-07

## 2019-05-31 RX ORDER — DAPAGLIFLOZIN 10 MG/1
10 TABLET, FILM COATED ORAL
COMMUNITY
Start: 2018-12-10 | End: 2021-06-30

## 2019-05-31 RX ORDER — ALFUZOSIN HYDROCHLORIDE 10 MG/1
10 TABLET, EXTENDED RELEASE ORAL DAILY
Qty: 60 TABLET | Refills: 5 | Status: SHIPPED | OUTPATIENT
Start: 2019-05-31 | End: 2020-03-16

## 2019-05-31 ASSESSMENT — PAIN SCALES - GENERAL: PAINLEVEL: NO PAIN (0)

## 2019-05-31 ASSESSMENT — MIFFLIN-ST. JEOR: SCORE: 1730.35

## 2019-05-31 NOTE — PROGRESS NOTES
Monster is a 73-year-old male with BPH and no evidence for prostate cancer.  He has a normal urinalysis today and a 16 cc postvoid residual.  He takes tamsulosin daily.  He is voiding more frequently and more urgently since he started Farxiga for his diabetes.  He does have urgency with incontinence and wears a pad.  He was having this problem last year and we discussed decreasing caffeine, alcohol and chocolate- he really has not changed anything.  He was off farxiga for a week of vacation and noticed less urinary frequency and urgency.  He denies any dysuria or hematuria, he has stuffy nose and runny nose-no allergies on testing  There is no family history of prostate cancer  Other past medical history: Diabetes, atrial fibrillation, sleep apnea, non-smoker.  Hand surgery  Medications: Tamsulosin, baby aspirin, Lipitor, Astelin spray, betamethasone cream, Temovate ointment, Farxiga, Enbrel, fish oil, Flonase, metformin, multivitamin, sildenafil, Levitra, Altase, simvastatin  Allergies: Fenofibrate  Exam: Alert and oriented, normal appearance, normal vital signs.  Normocephalic, normal respirations, neuro grossly intact.  Normal sphincter tone, no rectal mass or impaction, benign feeling prostate about 30 cc, normal seminal vesicles  Assessment: Urinary frequency and urgency with urge incontinence.  This is worse after he started farxiga and improved when he held this medication for a week.  Discussed causes of overactive bladder/neurogenic bladder and treatment options.  Consider stopping caffeine, talk to his primary care physician about Farxiga.  Would hate to have to start him on an antimuscarinic or Myrbetriq to control his urgency  See me yearly for urinalysis, postvoid residual, digital rectal exam.  No PSA unless palpable abnormality  Stop tamsulosin and start alfuzosin 10 mg daily with food- this may resolve his runny and stuffy nose

## 2019-05-31 NOTE — LETTER
5/31/2019       RE: Monster Olmedo  [address]  [place] [address]     Dear Colleague,    Thank you for referring your patient, Monster Olmedo, to the [facility] UROLOGY [facility]IC [place] at [facility]. Please see a copy of my visit note below.    Monster is a 73-year-old male with BPH and no evidence for prostate cancer.  He has a normal urinalysis today and a 16 cc postvoid residual.  He takes tamsulosin daily.  He is voiding more frequently and more urgently since he started Farxiga for his diabetes.  He does have urgency with incontinence and wears a pad.  He was having this problem last year and we discussed decreasing caffeine, alcohol and chocolate- he really has not changed anything.  He was off [place] for a week of vacation and noticed less urinary frequency and urgency.  He denies any dysuria or hematuria, he has stuffy nose and runny nose-no allergies on testing  There is no family history of prostate cancer  Other past medical history: Diabetes, atrial fibrillation, sleep apnea, non-smoker.  Hand surgery  Medications: Tamsulosin, baby aspirin, Lipitor, Astelin spray, betamethasone cream, Temovate ointment, Farxiga, Enbrel, fish oil, Flonase, metformin, multivitamin, sildenafil, Levitra, Altase, simvastatin  Allergies: Fenofibrate  Exam: Alert and oriented, normal appearance, normal vital signs.  Normocephalic, normal respirations, neuro grossly intact.  Normal sphincter tone, no rectal mass or impaction, benign feeling prostate about 30 cc, normal seminal vesicles  Assessment: Urinary frequency and urgency with urge incontinence.  This is worse after he started farxiga and improved when he held this medication for a week.  Discussed causes of overactive bladder/neurogenic bladder and treatment options.  Consider stopping caffeine, talk to his primary care physician about Farxiga.  Would hate to have to start him on an antimuscarinic or  Myrbetriq to control his urgency  See me yearly for urinalysis, postvoid residual, digital rectal exam.  No PSA unless palpable abnormality  Stop tamsulosin and start alfuzosin 10 mg daily with food- this may resolve his runny and stuffy nose    Again, thank you for allowing me to participate in the care of your patient.      Sincerely,    Bright Mtz MD

## 2019-05-31 NOTE — NURSING NOTE
Here for HUYEN and PVR check . PVR by bladder scan was 16ml. He  Is having frequency.Aranza Rodrigues LPN

## 2019-07-01 DIAGNOSIS — N40.0 BPH (BENIGN PROSTATIC HYPERPLASIA): ICD-10-CM

## 2019-07-01 RX ORDER — TAMSULOSIN HYDROCHLORIDE 0.4 MG/1
CAPSULE ORAL
Qty: 1 CAPSULE | Refills: 0 | OUTPATIENT
Start: 2019-07-01

## 2019-10-14 ENCOUNTER — APPOINTMENT (OUTPATIENT)
Age: 74
Setting detail: DERMATOLOGY
End: 2019-10-14

## 2019-10-14 VITALS — WEIGHT: 225 LBS | RESPIRATION RATE: 14 BRPM | HEIGHT: 69 IN

## 2019-10-14 DIAGNOSIS — L40.0 PSORIASIS VULGARIS: ICD-10-CM

## 2019-10-14 PROCEDURE — OTHER PRESCRIPTION: OTHER

## 2019-10-14 PROCEDURE — OTHER VENIPUNCTURE: OTHER

## 2019-10-14 PROCEDURE — 36415 COLL VENOUS BLD VENIPUNCTURE: CPT

## 2019-10-14 PROCEDURE — OTHER COUNSELING: OTHER

## 2019-10-14 PROCEDURE — OTHER ADDITIONAL NOTES: OTHER

## 2019-10-14 PROCEDURE — 99214 OFFICE O/P EST MOD 30 MIN: CPT | Mod: 25

## 2019-10-14 PROCEDURE — OTHER ORDER TESTS: OTHER

## 2019-10-14 PROCEDURE — OTHER HUMIRA INITIATION: OTHER

## 2019-10-14 RX ORDER — ADALIMUMAB 40MG/0.4ML
KIT SUBCUTANEOUS
Qty: 1 | Refills: 6 | Status: ERX

## 2019-10-14 ASSESSMENT — LOCATION SIMPLE DESCRIPTION DERM
LOCATION SIMPLE: RIGHT UPPER BACK
LOCATION SIMPLE: LEFT ELBOW
LOCATION SIMPLE: LEFT KNEE
LOCATION SIMPLE: RIGHT ELBOW
LOCATION SIMPLE: RIGHT KNEE

## 2019-10-14 ASSESSMENT — LOCATION DETAILED DESCRIPTION DERM
LOCATION DETAILED: LEFT KNEE
LOCATION DETAILED: RIGHT ELBOW
LOCATION DETAILED: LEFT ELBOW
LOCATION DETAILED: RIGHT MEDIAL UPPER BACK
LOCATION DETAILED: RIGHT KNEE

## 2019-10-14 ASSESSMENT — LOCATION ZONE DERM
LOCATION ZONE: TRUNK
LOCATION ZONE: LEG
LOCATION ZONE: ARM

## 2019-10-14 NOTE — PROCEDURE: ADDITIONAL NOTES
Detail Level: Detailed
Additional Notes: Patient has 3 more Enbrel syringes left, so he is going to finish those out then will begin maintenance dosage for Humira. [facility] didn’t feel he needed starter dosage because he is almost totally clear with Enbrel. His reason for switching from Enbrel to Humira is because Enbrel isn’t included in his formulary plan anymore.

## 2019-10-14 NOTE — PROCEDURE: HUMIRA INITIATION
Add Pregnancy And Lactation Warning To Medication Counseling?: No
Diagnosis (Required): Psoriasis
Detail Level: Zone
Humira Dosing: 80 mg SC day 0, 40 mg SC day 7, then 40 mg SC every other week
Humira Monitoring Guidelines: - Discussed that a panel of labs need to be drawn at baseline and monitored periodically. \\n- Additionally, the patient will need to be screened for tuberculosis and Hepatitis B annually including at baseline.\\n- It is important to not take drug holidays unless otherwise instructed as this can affect a patient's ability to recapture the same degree of skin clearance upon restarting.

## 2019-10-14 NOTE — PROCEDURE: VENIPUNCTURE
Bill For Individual Tests Below?: no
Number Of Tubes Drawn: 3
Detail Level: None
Venipuncture Paragraph: An alcohol pad was applied to the venipuncture site: L AC Fossa. Venipuncture was performed using a butterfly needle. Pressure and a bandaid was applied to the site. No complications were noted.

## 2019-12-16 ENCOUNTER — HEALTH MAINTENANCE LETTER (OUTPATIENT)
Age: 74
End: 2019-12-16

## 2020-03-16 DIAGNOSIS — N40.1 BENIGN PROSTATIC HYPERPLASIA WITH URINARY FREQUENCY: ICD-10-CM

## 2020-03-16 DIAGNOSIS — R35.0 BENIGN PROSTATIC HYPERPLASIA WITH URINARY FREQUENCY: ICD-10-CM

## 2020-03-16 RX ORDER — ALFUZOSIN HYDROCHLORIDE 10 MG/1
10 TABLET, EXTENDED RELEASE ORAL DAILY
Qty: 90 TABLET | Refills: 0 | Status: SHIPPED | OUTPATIENT
Start: 2020-03-16 | End: 2020-06-08

## 2020-06-07 DIAGNOSIS — N40.1 BENIGN PROSTATIC HYPERPLASIA WITH URINARY FREQUENCY: ICD-10-CM

## 2020-06-07 DIAGNOSIS — R35.0 BENIGN PROSTATIC HYPERPLASIA WITH URINARY FREQUENCY: ICD-10-CM

## 2020-06-08 RX ORDER — ALFUZOSIN HYDROCHLORIDE 10 MG/1
10 TABLET, EXTENDED RELEASE ORAL DAILY
Qty: 90 TABLET | Refills: 0 | Status: SHIPPED | OUTPATIENT
Start: 2020-06-08 | End: 2020-08-31

## 2020-06-22 ENCOUNTER — TRANSFERRED RECORDS (OUTPATIENT)
Dept: HEALTH INFORMATION MANAGEMENT | Facility: CLINIC | Age: 75
End: 2020-06-22

## 2020-08-27 DIAGNOSIS — N40.0 BPH (BENIGN PROSTATIC HYPERPLASIA): Primary | ICD-10-CM

## 2020-08-28 ENCOUNTER — OFFICE VISIT (OUTPATIENT)
Dept: UROLOGY | Facility: CLINIC | Age: 75
End: 2020-08-28
Payer: COMMERCIAL

## 2020-08-28 VITALS
BODY MASS INDEX: 33.33 KG/M2 | HEART RATE: 88 BPM | WEIGHT: 225 LBS | SYSTOLIC BLOOD PRESSURE: 136 MMHG | OXYGEN SATURATION: 98 % | HEIGHT: 69 IN | DIASTOLIC BLOOD PRESSURE: 70 MMHG

## 2020-08-28 DIAGNOSIS — R35.0 BENIGN PROSTATIC HYPERPLASIA WITH URINARY FREQUENCY: ICD-10-CM

## 2020-08-28 DIAGNOSIS — N40.1 BENIGN PROSTATIC HYPERPLASIA WITH URINARY FREQUENCY: ICD-10-CM

## 2020-08-28 LAB
ALBUMIN UR-MCNC: NEGATIVE MG/DL
APPEARANCE UR: CLEAR
BILIRUB UR QL STRIP: NEGATIVE
COLOR UR AUTO: YELLOW
GLUCOSE UR STRIP-MCNC: 500 MG/DL
HGB UR QL STRIP: NEGATIVE
KETONES UR STRIP-MCNC: NEGATIVE MG/DL
LEUKOCYTE ESTERASE UR QL STRIP: NEGATIVE
NITRATE UR QL: NEGATIVE
PH UR STRIP: 5.5 PH (ref 5–7)
SOURCE: ABNORMAL
SP GR UR STRIP: 1.02 (ref 1–1.03)
UROBILINOGEN UR STRIP-ACNC: 0.2 EU/DL (ref 0.2–1)

## 2020-08-28 PROCEDURE — 81003 URINALYSIS AUTO W/O SCOPE: CPT | Performed by: UROLOGY

## 2020-08-28 PROCEDURE — 99212 OFFICE O/P EST SF 10 MIN: CPT | Performed by: UROLOGY

## 2020-08-28 ASSESSMENT — PAIN SCALES - GENERAL: PAINLEVEL: NO PAIN (0)

## 2020-08-28 ASSESSMENT — MIFFLIN-ST. JEOR: SCORE: 1755.97

## 2020-08-28 NOTE — PROGRESS NOTES
Monster is a 74-year-old male with BPH and no family history of prostate cancer.  He has a normal urinalysis except for some glucosuria.  He is back on farxiga and is urinating with more frequency as before.  Last year his postvoid residual was very small.  He is having no dysuria or hematuria.  He is also bloated and is seeing a gastroenterologist who advised a CT scan of the abdomen and pelvis next week and follow-up for colonoscopy.  He is known to have diverticulitis.  He is worried that he might have cancer-friend recently was diagnosed with pancreatic cancer and had similar symptoms.  Other past medical history: Diabetes, atrial fibrillation, sleep apnea, non-smoker  Medication list reviewed.  Allergies: Fenofibrate  Exam: Alert and oriented, normal appearance other than abdominal obesity, normal vital signs, normal respirations, neuro grossly intact.  Normal sphincter tone, no rectal mass or impaction, benign and symmetric prostate of about 30 cc, normal seminal vesicles  Assessment: BPH, no evidence for carcinoma.   Plan: Follow through with GI evaluation.  Yearly digital rectal exam, urinalysis.  Check postvoid residual again next year.  No PSA unless palpable abnormality

## 2020-08-28 NOTE — LETTER
8/28/2020       RE: Monster Olmedo  [address]  [place] [address]     Dear Colleague,    Thank you for referring your patient, Monster Olmedo, to the [facility] UROLOGY CLINIC [place] at [facility]. Please see a copy of my visit note below.    Monster is a 74-year-old male with BPH and no family history of prostate cancer.  He has a normal urinalysis except for some glucosuria.  He is back on farxiga and is urinating with more frequency as before.  Last year his postvoid residual was very small.  He is having no dysuria or hematuria.  He is also bloated and is seeing a gastroenterologist who advised a CT scan of the abdomen and pelvis next week and follow-up for colonoscopy.  He is known to have diverticulitis.  He is worried that he might have cancer-friend recently was diagnosed with pancreatic cancer and had similar symptoms.  Other past medical history: Diabetes, atrial fibrillation, sleep apnea, non-smoker  Medication list reviewed.  Allergies: Fenofibrate  Exam: Alert and oriented, normal appearance other than abdominal obesity, normal vital signs, normal respirations, neuro grossly intact.  Normal sphincter tone, no rectal mass or impaction, benign and symmetric prostate of about 30 cc, normal seminal vesicles  Assessment: BPH, no evidence for carcinoma.   Plan: Follow through with GI evaluation.  Yearly digital rectal exam, urinalysis.  Check postvoid residual again next year.  No PSA unless palpable abnormality    Again, thank you for allowing me to participate in the care of your patient.      Sincerely,    Bright Mtz MD

## 2020-08-28 NOTE — NURSING NOTE
Chief Complaint   Patient presents with     Benign Prostatic Hypertrophy     Patient here to talk today state his stomach seem like it fill out gas and discuss medications as well           UA RESULTS:  Recent Labs   Lab Test 08/28/20  1305  06/11/18  0356   COLOR Yellow   < > Light Yellow   APPEARANCE Clear   < > Clear   URINEGLC 500*   < > Negative   URINEBILI Negative   < > Negative   URINEKETONE Negative   < > Negative   SG 1.025   < > 1.010   UBLD Negative   < > Negative   URINEPH 5.5   < > 6.0   PROTEIN Negative   < > 10*   UROBILINOGEN 0.2   < >  --    NITRITE Negative   < > Negative   LEUKEST Negative   < > Negative   RBCU  --   --  <1   WBCU  --   --  0    < > = values in this interval not displayed.       Kim Neil, A

## 2020-08-30 DIAGNOSIS — R35.0 BENIGN PROSTATIC HYPERPLASIA WITH URINARY FREQUENCY: ICD-10-CM

## 2020-08-30 DIAGNOSIS — N40.1 BENIGN PROSTATIC HYPERPLASIA WITH URINARY FREQUENCY: ICD-10-CM

## 2020-08-31 RX ORDER — ALFUZOSIN HYDROCHLORIDE 10 MG/1
TABLET, EXTENDED RELEASE ORAL
Qty: 90 TABLET | Refills: 3 | Status: SHIPPED | OUTPATIENT
Start: 2020-08-31 | End: 2021-06-30

## 2020-09-04 ENCOUNTER — APPOINTMENT (OUTPATIENT)
Dept: URBAN - METROPOLITAN AREA CLINIC 256 | Age: 75
Setting detail: DERMATOLOGY
End: 2020-09-04

## 2020-09-04 VITALS — WEIGHT: 225 LBS | HEIGHT: 69 IN | RESPIRATION RATE: 14 BRPM

## 2020-09-04 DIAGNOSIS — L81.4 OTHER MELANIN HYPERPIGMENTATION: ICD-10-CM

## 2020-09-04 DIAGNOSIS — L40.0 PSORIASIS VULGARIS: ICD-10-CM

## 2020-09-04 DIAGNOSIS — L57.8 OTHER SKIN CHANGES DUE TO CHRONIC EXPOSURE TO NONIONIZING RADIATION: ICD-10-CM

## 2020-09-04 DIAGNOSIS — L82.1 OTHER SEBORRHEIC KERATOSIS: ICD-10-CM

## 2020-09-04 DIAGNOSIS — L72.0 EPIDERMAL CYST: ICD-10-CM

## 2020-09-04 PROBLEM — D23.72 OTHER BENIGN NEOPLASM OF SKIN OF LEFT LOWER LIMB, INCLUDING HIP: Status: ACTIVE | Noted: 2020-09-04

## 2020-09-04 PROCEDURE — OTHER ADDITIONAL NOTES: OTHER

## 2020-09-04 PROCEDURE — OTHER COUNSELING: OTHER

## 2020-09-04 PROCEDURE — OTHER REASSURANCE: OTHER

## 2020-09-04 PROCEDURE — 99214 OFFICE O/P EST MOD 30 MIN: CPT

## 2020-09-04 PROCEDURE — OTHER PRESCRIPTION: OTHER

## 2020-09-04 RX ORDER — BETAMETHASONE DIPROPIONATE 0.5 MG/G
0.05% CREAM TOPICAL BID
Qty: 1 | Refills: 3 | Status: ERX | COMMUNITY
Start: 2020-09-04

## 2020-09-04 ASSESSMENT — LOCATION ZONE DERM
LOCATION ZONE: FACE
LOCATION ZONE: ARM

## 2020-09-04 ASSESSMENT — LOCATION DETAILED DESCRIPTION DERM
LOCATION DETAILED: RIGHT PROXIMAL DORSAL FOREARM
LOCATION DETAILED: LEFT CENTRAL MALAR CHEEK

## 2020-09-04 ASSESSMENT — LOCATION SIMPLE DESCRIPTION DERM
LOCATION SIMPLE: RIGHT FOREARM
LOCATION SIMPLE: LEFT CHEEK

## 2020-09-04 NOTE — PROCEDURE: ADDITIONAL NOTES
Additional Notes: Patient had his gallbladder removed and his psoriasis is much improved, he has not had an Enbrel injection for several months. Will try to manage with topical steroids and Kenolog injections.
Detail Level: Detailed

## 2020-10-30 ENCOUNTER — VIRTUAL VISIT (OUTPATIENT)
Dept: UROLOGY | Facility: CLINIC | Age: 75
End: 2020-10-30
Payer: COMMERCIAL

## 2020-10-30 VITALS — WEIGHT: 220 LBS | BODY MASS INDEX: 32.58 KG/M2 | HEIGHT: 69 IN

## 2020-10-30 DIAGNOSIS — N40.1 BENIGN PROSTATIC HYPERPLASIA WITH URINARY RETENTION: Primary | ICD-10-CM

## 2020-10-30 DIAGNOSIS — R33.8 BENIGN PROSTATIC HYPERPLASIA WITH URINARY RETENTION: Primary | ICD-10-CM

## 2020-10-30 PROCEDURE — 99212 OFFICE O/P EST SF 10 MIN: CPT | Mod: TEL | Performed by: UROLOGY

## 2020-10-30 ASSESSMENT — PAIN SCALES - GENERAL: PAINLEVEL: NO PAIN (0)

## 2020-10-30 ASSESSMENT — MIFFLIN-ST. JEOR: SCORE: 1733.29

## 2020-10-30 NOTE — PROGRESS NOTES
"Monster Olmedo is a 74 year old male who is being evaluated via a billable telephone visit.      The patient has been notified of following:     \"This telephone visit will be conducted via a call between you and your physician/provider. We have found that certain health care needs can be provided without the need for a physical exam.  This service lets us provide the care you need with a short phone conversation.  If a prescription is necessary we can send it directly to your pharmacy.  If lab work is needed we can place an order for that and you can then stop by our lab to have the test done at a later time.    Telephone visits are billed at different rates depending on your insurance coverage. During this emergency period, for some insurers they may be billed the same as an in-person visit.  Please reach out to your insurance provider with any questions.    If during the course of the call the physician/provider feels a telephone visit is not appropriate, you will not be charged for this service.\"    Patient has given verbal consent for Telephone visit?  Yes    What phone number would you like to be contacted at? [phone]    Notes: Monster is a 74-year-old male with no family history of prostate cancer normal digital rectal exam in August of this year.  His CT scan for GI evaluation was reported as having a mildly enlarged prostate.  Because of this his primary care physician did a PSA which was 7.58.  This compares to 3.082 years ago, 9.01 in 2015 and 8.02 in 2014.  The patient had a normal MRI scan of the prostate in 2015 and had normal biopsies of the prostate previous to this.  Other past medical history, medications and allergies reviewed  Exam: Alert and oriented.  No respiratory signs or admitted symptoms  Assessment: BPH-no evidence of prostate cancer on digital rectal exam or previous evaluations.  PSA is actually lower than some in the past.  Would not do MRI scan or biopsies of the prostate at this " time without a family history of prostate cancer and a normal HUYEN.  He should return next August for urinalysis and HUYEN with one of my partners.    How would you like to obtain your AVS? My chart  FRANK Rand CMA      Phone call duration: 12 minutes    WM Smith MD

## 2020-10-30 NOTE — LETTER
"10/30/2020       RE: Monster Olmedo  [address]  [place] [address]     Dear Colleague,    Thank you for referring your patient, Monster Olmedo, to the [facility] UROLOGY CLINIC [place] at [facility]. Please see a copy of my visit note below.    Monster Olmedo is a 74 year old male who is being evaluated via a billable telephone visit.      The patient has been notified of following:     \"This telephone visit will be conducted via a call between you and your physician/provider. We have found that certain health care needs can be provided without the need for a physical exam.  This service lets us provide the care you need with a short phone conversation.  If a prescription is necessary we can send it directly to your pharmacy.  If lab work is needed we can place an order for that and you can then stop by our lab to have the test done at a later time.    Telephone visits are billed at different rates depending on your insurance coverage. During this emergency period, for some insurers they may be billed the same as an in-person visit.  Please reach out to your insurance provider with any questions.    If during the course of the call the physician/provider feels a telephone visit is not appropriate, you will not be charged for this service.\"    Patient has given verbal consent for Telephone visit?  Yes    What phone number would you like to be contacted at? [phone]    Notes: Monster is a 74-year-old male with no family history of prostate cancer normal digital rectal exam in August of this year.  His CT scan for GI evaluation was reported as having a mildly enlarged prostate.  Because of this his primary care physician did a PSA which was 7.58.  This compares to 3.082 years ago, 9.01 in 2015 and 8.02 in 2014.  The patient had a normal MRI scan of the prostate in 2015 and had normal biopsies of the prostate previous to this.  Other past medical history, " medications and allergies reviewed  Exam: Alert and oriented.  No respiratory signs or admitted symptoms  Assessment: BPH-no evidence of prostate cancer on digital rectal exam or previous evaluations.  PSA is actually lower than some in the past.  Would not do MRI scan or biopsies of the prostate at this time without a family history of prostate cancer and a normal HUYEN.  He should return next August for urinalysis and HUYEN with one of my partners.    How would you like to obtain your AVS? My chart  FRANK Rand [facility]      Phone call duration: 12 minutes    WM Smith MD          Again, thank you for allowing me to participate in the care of your patient.      Sincerely,    Bright Mtz MD

## 2021-01-15 ENCOUNTER — HEALTH MAINTENANCE LETTER (OUTPATIENT)
Age: 76
End: 2021-01-15

## 2021-06-16 ENCOUNTER — VIRTUAL VISIT (OUTPATIENT)
Dept: UROLOGY | Facility: CLINIC | Age: 76
End: 2021-06-16
Payer: COMMERCIAL

## 2021-06-16 VITALS — WEIGHT: 215 LBS | HEIGHT: 69 IN | BODY MASS INDEX: 31.84 KG/M2

## 2021-06-16 DIAGNOSIS — Z87.440 HISTORY OF UTI: Primary | ICD-10-CM

## 2021-06-16 PROCEDURE — 99213 OFFICE O/P EST LOW 20 MIN: CPT | Mod: 95 | Performed by: PHYSICIAN ASSISTANT

## 2021-06-16 RX ORDER — GLIPIZIDE 5 MG/1
4 TABLET ORAL DAILY
Status: ON HOLD | COMMUNITY
Start: 2021-06-04 | End: 2022-08-17

## 2021-06-16 ASSESSMENT — PAIN SCALES - GENERAL: PAINLEVEL: NO PAIN (0)

## 2021-06-16 ASSESSMENT — MIFFLIN-ST. JEOR: SCORE: 1697.67

## 2021-06-16 NOTE — PROGRESS NOTES
*PT CAN HAVE APPT AT 2:00*  *SEND LINK TO CELL PHONE*    OLD [facility] PT.  PT HAD UTI.  PT FEELS BETTER.  PT HAS URGENCY.  PT HAD FREQ WITH UTI.  FREQ BETTER NOW WITH OUT UTI.  PT UP 3 TIMES A NT.  PT NERVOUS ABOUT TRAVEL DUE TO FREQ AND URG.  KEGAL EXERCISES???  ALFUZOSIN... WHEN SHOULD HE TAKE IT?  I MENTIONED IT. :/    Monster is a 75 year old who is being evaluated via a billable video visit.      How would you like to obtain your AVS? MyChart  If the video visit is dropped, the invitation should be resent by: Text to cell phone: [phone]  Will anyone else be joining your video visit? No      Video Start Time: 2:22 PM  Video-Visit Details    Type of service:  Video Visit    Video End Time:2:33 PM    Originating Location (pt. Location): Home    Distant Location (provider location):  [facility] [place]     Platform used for Video Visit: FOLUP     CC: follow-up     HPI:  Monster is a pleasant 75-year-old male with no family history of prostate cancer normal digital rectal exam in August 2020 with Dr. Mtz.  His CT scan for GI evaluation in 2018 was reported as having a mildly enlarged prostate.  Because of this his primary care physician did a PSA which was 7.58.  This compares to 3.082 years ago, 9.01 in 2015 and 8.02 in 2014.  The patient had a normal MRI scan of the prostate in 2015 and had normal biopsies of the prostate previous to this.  No further PSAs were recommended as long as HUYEN was without palpable abnormality.    In 2017 came off of finasteride (due to libido effects) and continued with Flomax. He is back on farxiga and is urinating with more frequency as before.  In the past his postvoid residuals were very small    He had dysuria, urgency, freq and on 6/3/21 UC noted 50-100k e coli UTI. Treated for UTI with Cipro.     Has had urgency, frequency, nocturia x 3-4 x 6 months.     Past Medical History:   Diagnosis Date     Diabetes (H)      Paroxysmal atrial fibrillation (H) 9/23/16      Sleep apnea      Current Outpatient Medications   Medication     alfuzosin ER (UROXATRAL) 10 MG 24 hr tablet     atorvastatin (LIPITOR) 20 MG tablet     betamethasone dipropionate (DIPROSONE) 0.05 % cream     clobetasol (TEMOVATE) 0.05 % ointment     fish oil-omega-3 fatty acids 1000 MG capsule     glipiZIDE (GLUCOTROL) 5 MG tablet     metFORMIN (GLUCOPHAGE) 1000 MG tablet     Multiple vitamin TABS     order for DME     Probiotic Product (PROBIOTIC DAILY PO)     ramipril (ALTACE) 5 MG capsule     sildenafil (REVATIO) 20 MG tablet     Simethicone (GAS-X PO)     simvastatin (ZOCOR) 10 MG tablet     Turmeric (QC TUMERIC COMPLEX PO)     aspirin EC 81 MG tablet     azelastine (ASTELIN) 0.1 % nasal spray     dapagliflozin (FARXIGA) 10 MG TABS tablet     etanercept (ENBREL) 25 MG vial injection kit     finasteride (PROSCAR) 5 MG tablet     fluticasone (FLONASE) 50 MCG/ACT spray     ondansetron (ZOFRAN ODT) 4 MG ODT tab     oxyCODONE IR (ROXICODONE) 5 MG tablet     ramipril (ALTACE) 2.5 MG capsule     tamsulosin (FLOMAX) 0.4 MG capsule     vardenafil (LEVITRA) 20 MG tablet     No current facility-administered medications for this visit.      Social History     Socioeconomic History     Marital status:      Spouse name: Not on file     Number of children: Not on file     Years of education: Not on file     Highest education level: Not on file   Occupational History     Not on file   Social Needs     Financial resource strain: Not on file     Food insecurity     Worry: Not on file     Inability: Not on file     Transportation needs     Medical: Not on file     Non-medical: Not on file   Tobacco Use     Smoking status: Never Smoker     Smokeless tobacco: Never Used   Substance and Sexual Activity     Alcohol use: Yes     Comment: occ     Drug use: No     Sexual activity: Not on file   Lifestyle     Physical activity     Days per week: Not on file     Minutes per session: Not on file     Stress: Not on file    Relationships     Social connections     Talks on phone: Not on file     Gets together: Not on file     Attends Rastafarian service: Not on file     Active member of club or organization: Not on file     Attends meetings of clubs or organizations: Not on file     Relationship status: Not on file     Intimate partner violence     Fear of current or ex partner: Not on file     Emotionally abused: Not on file     Physically abused: Not on file     Forced sexual activity: Not on file   Other Topics Concern     Parent/sibling w/ CABG, MI or angioplasty before 65F 55M? Not Asked      Service Not Asked     Blood Transfusions Not Asked     Caffeine Concern No     Comment: coffee: 3 cups a day     Occupational Exposure Not Asked     Hobby Hazards Not Asked     Sleep Concern Yes     Comment: CPAP     Stress Concern No     Weight Concern No     Special Diet No     Back Care Not Asked     Exercise Yes     Comment: walking x3 a week     Bike Helmet Not Asked     Seat Belt Yes     Self-Exams Not Asked   Social History Narrative     Not on file     ROS: 10-pt ROS neg other than that noted in the HPI.  PSYCH: NAD  EYES: EOMI  NEURO: AAO x3    A/P: BPH w/ LUTS, UTI  -Cysto to eval for MARSHALL  -CT urogram    Jazzy Barron PA-C  [facility]  27 minutes spent on the date of the encounter doing chart review, review of outside records, review of test results, interpretation of tests, patient visit and documentation

## 2021-06-21 ENCOUNTER — HOSPITAL ENCOUNTER (OUTPATIENT)
Dept: CT IMAGING | Facility: CLINIC | Age: 76
Discharge: HOME | End: 2021-06-21
Attending: PHYSICIAN ASSISTANT | Admitting: PHYSICIAN ASSISTANT
Payer: COMMERCIAL

## 2021-06-21 DIAGNOSIS — Z87.440 HISTORY OF UTI: ICD-10-CM

## 2021-06-21 LAB
CREAT BLD-MCNC: 0.8 MG/DL (ref 0.66–1.25)
GFR SERPL CREATININE-BSD FRML MDRD: >90 ML/MIN/{1.73_M2}

## 2021-06-21 PROCEDURE — 250N000011 HC RX IP 250 OP 636: Performed by: PHYSICIAN ASSISTANT

## 2021-06-21 PROCEDURE — 250N000009 HC RX 250: Performed by: PHYSICIAN ASSISTANT

## 2021-06-21 PROCEDURE — 74178 CT ABD&PLV WO CNTR FLWD CNTR: CPT

## 2021-06-21 PROCEDURE — 82565 ASSAY OF CREATININE: CPT

## 2021-06-21 RX ORDER — IOPAMIDOL 755 MG/ML
120 INJECTION, SOLUTION INTRAVASCULAR ONCE
Status: COMPLETED | OUTPATIENT
Start: 2021-06-21 | End: 2021-06-21

## 2021-06-21 RX ADMIN — SODIUM CHLORIDE 100 ML: 9 INJECTION, SOLUTION INTRAVENOUS at 16:03

## 2021-06-21 RX ADMIN — IOPAMIDOL 120 ML: 755 INJECTION, SOLUTION INTRAVENOUS at 16:02

## 2021-06-30 ENCOUNTER — OFFICE VISIT (OUTPATIENT)
Dept: UROLOGY | Facility: CLINIC | Age: 76
End: 2021-06-30
Payer: COMMERCIAL

## 2021-06-30 VITALS
WEIGHT: 220 LBS | HEIGHT: 69 IN | OXYGEN SATURATION: 97 % | HEART RATE: 74 BPM | BODY MASS INDEX: 32.58 KG/M2 | SYSTOLIC BLOOD PRESSURE: 130 MMHG | DIASTOLIC BLOOD PRESSURE: 70 MMHG

## 2021-06-30 DIAGNOSIS — Z87.440 HISTORY OF UTI: Primary | ICD-10-CM

## 2021-06-30 DIAGNOSIS — R97.20 ELEVATED PROSTATE SPECIFIC ANTIGEN (PSA): ICD-10-CM

## 2021-06-30 DIAGNOSIS — N40.1 BENIGN PROSTATIC HYPERPLASIA WITH NOCTURIA: ICD-10-CM

## 2021-06-30 DIAGNOSIS — R35.1 BENIGN PROSTATIC HYPERPLASIA WITH NOCTURIA: ICD-10-CM

## 2021-06-30 LAB
ALBUMIN UR-MCNC: NEGATIVE MG/DL
APPEARANCE UR: CLEAR
BILIRUB UR QL STRIP: NEGATIVE
COLOR UR AUTO: YELLOW
GLUCOSE UR STRIP-MCNC: NEGATIVE MG/DL
HGB UR QL STRIP: NEGATIVE
KETONES UR STRIP-MCNC: NEGATIVE MG/DL
LEUKOCYTE ESTERASE UR QL STRIP: NEGATIVE
NITRATE UR QL: NEGATIVE
PH UR STRIP: 5.5 PH (ref 5–7)
RESIDUAL VOLUME (RV) (EXTERNAL): 42
SOURCE: NORMAL
SP GR UR STRIP: 1.02 (ref 1–1.03)
UROBILINOGEN UR STRIP-ACNC: 0.2 EU/DL (ref 0.2–1)

## 2021-06-30 PROCEDURE — 52000 CYSTOURETHROSCOPY: CPT | Performed by: STUDENT IN AN ORGANIZED HEALTH CARE EDUCATION/TRAINING PROGRAM

## 2021-06-30 PROCEDURE — 81003 URINALYSIS AUTO W/O SCOPE: CPT | Performed by: STUDENT IN AN ORGANIZED HEALTH CARE EDUCATION/TRAINING PROGRAM

## 2021-06-30 PROCEDURE — 99214 OFFICE O/P EST MOD 30 MIN: CPT | Mod: 25 | Performed by: STUDENT IN AN ORGANIZED HEALTH CARE EDUCATION/TRAINING PROGRAM

## 2021-06-30 PROCEDURE — 51798 US URINE CAPACITY MEASURE: CPT | Performed by: STUDENT IN AN ORGANIZED HEALTH CARE EDUCATION/TRAINING PROGRAM

## 2021-06-30 RX ORDER — FINASTERIDE 5 MG/1
5 TABLET, FILM COATED ORAL DAILY
Qty: 90 TABLET | Refills: 3 | Status: SHIPPED | OUTPATIENT
Start: 2021-06-30 | End: 2022-05-27

## 2021-06-30 RX ORDER — ALFUZOSIN HYDROCHLORIDE 10 MG/1
10 TABLET, EXTENDED RELEASE ORAL DAILY
Qty: 90 TABLET | Refills: 3 | Status: SHIPPED | OUTPATIENT
Start: 2021-06-30

## 2021-06-30 RX ORDER — DIAZEPAM 5 MG
5 TABLET ORAL PRN
Qty: 1 TABLET | Refills: 0 | Status: ON HOLD | OUTPATIENT
Start: 2021-06-30 | End: 2022-08-17

## 2021-06-30 RX ORDER — LIDOCAINE HYDROCHLORIDE 20 MG/ML
JELLY TOPICAL ONCE
Status: DISCONTINUED | OUTPATIENT
Start: 2021-06-30 | End: 2021-06-30 | Stop reason: HOSPADM

## 2021-06-30 ASSESSMENT — PAIN SCALES - GENERAL: PAINLEVEL: NO PAIN (0)

## 2021-06-30 ASSESSMENT — MIFFLIN-ST. JEOR: SCORE: 1720.35

## 2021-06-30 NOTE — PROGRESS NOTES
"CHIEF COMPLAINT   Monster Olmedo who is a 75 year old male returns today for follow-up of BPH with nocturia, UTI, elevated PSA.      HPI   Monster Olmedo is a 75 year old male who presents with a history of BPH with nocturia, UTI, elevated PSA    Last PSA 10/21/2020 7.58 ng/ml    Had previously been in finasteride in the past but had issues with sexual function. Is now only on alfuzosin.    Worsening LUTS especially nocturia, up to 6-7 times a night now.    UTI 6/3/21 with 50-100k E. Coli, treated with cipro.    PHYSICAL EXAM  Patient is a 75 year old  male   Vitals: Blood pressure 130/70, pulse 74, height 1.74 m (5' 8.5\"), weight 99.8 kg (220 lb), SpO2 97 %.  Body mass index is 32.96 kg/m .  General Appearance Adult:   Alert, no acute distress, oriented  HENT: throat/mouth:normal, good dentition  Lungs: no respiratory distress, or pursed lip breathing  Heart: No obvious jugular venous distension present  Abdomen: soft, nontender, no organomegaly or masses  Musculoskeltal: extremities normal, no peripheral edema  Skin: no suspicious lesions or rashes  Neuro: Alert, oriented, speech and mentation normal  Psych: affect and mood normal  Gait: Normal  : enlarged >80 gram prostate, benign feeling    PVR 42 ml    All pertinent imaging reviewed:    All imaging studies reviewed by me.  CT abd pelvis 6/21/2021      117 ml prostate by my estimate given the large median lobe      MRI prostate 10/19/2015    59 ml prostate estimated      PRE-PROCEDURE DIAGNOSIS: BPH with nocturia    POST-PROCEDURE DIAGNOSIS: BPH with nocturia    PROCEDURE: Cystoscopy    DESCRIPTION OF PROCEDURE: After informed consent was obtained, the patient was brought to the procedure room where he was placed in the supine position with all pressure points well padded.  The penis was prepped and draped in sterile fashion. A flexible cystoscope was introduced through a well-lubricated urethra.     Normal anterior urethra. Obstructive trilobar " hypertrophy with very large intravesical median lobe. ~5 cm prostatic urethral length. Bladder with moderate trabeculation and a few cellules, no diverticulae. No bladder stones. No concerning urothelial leesions.    ASSESSMENT and PLAN  75 year old male who presents with a history of BPH with nocturia, UTI, elevated PSA. Chronic lower urinary tract symptoms are worsening despite medical therapy with alpha blocker. He had discontinued 5-GABRIELLA in the past due to sexual side effects. He has a very large prostate on exam and by CT estimate with a prominent intravesical median lobe. He also has the sequleae of obstruction with trabeculation and cellules, and has recently had a UTI.    His PSA has been rising. Prior to offering outlet procedure will get an MRI to reassess the prostate. If there is a concerning lesion will need biopsy. If no lesion, will discuss outlet procedure (Greenlight PVP vs. Robotic simple prostatectomy vs. Referral for HOLEP vs. Rezum off label). Bladder scan today is low at 42 ml.    - Continue alfuzosin  - Restart finasteride  Prostate MRI - if concerning lesion (PIRADS 3,4,5), plan for Uronav MRI TRUS fusion prostate biopsy  If no concerning lesion (PIRADS 1,2), return to discuss outlet procedure    Bebeto Cardoza MD   [facility]  [facility] Phone: [phone]

## 2021-06-30 NOTE — NURSING NOTE
Chief Complaint   Patient presents with     Hx of UTI     Here for a in office cystoscopy     PVR scan was 42ml today    Prior to the start of the procedure and with procedural staff participation, I verbally confirmed the patient s identity using two indicators, relevant allergies, that the procedure was appropriate and matched the consent or emergent situation, and that the correct equipment/implants were available. Immediately prior to starting the procedure I conducted the Time Out with the procedural staff and re-confirmed the patient s name, procedure, and site/side. I have wiped the patient off with the povidone-Iodine solution, draped them,  used Lidocaine hydrochloride jelly, and instilled sterile water into the bladder. (The Joint Commission universal protocol was followed.)  Yes    Sedation (Moderate or Deep): Urojet    5mL 2% lidocaine hydrochloride Urojet instilled into urethra.    NDC# [address]-6  Lot #: [id number]  Expiration Date:  7-22    Freya Conteh

## 2021-06-30 NOTE — PATIENT INSTRUCTIONS

## 2021-06-30 NOTE — LETTER
"6/30/2021       RE: Monster Olmedo  [address]  [place] [address]     Dear Colleague,    Thank you for referring your patient, Monster Olmedo, to the [facility] at [facility]. Please see a copy of my visit note below.    CHIEF COMPLAINT   Monster Olmedo who is a 75 year old male returns today for follow-up of BPH with nocturia, UTI, elevated PSA.      HPI   Monster Olmedo is a 75 year old male who presents with a history of BPH with nocturia, UTI, elevated PSA    Last PSA 10/21/2020 7.58 ng/ml    Had previously been in finasteride in the past but had issues with sexual function. Is now only on alfuzosin.    Worsening LUTS especially nocturia, up to 6-7 times a night now.    UTI 6/3/21 with 50-100k E. Coli, treated with cipro.    PHYSICAL EXAM  Patient is a 75 year old  male   Vitals: Blood pressure 130/70, pulse 74, height 1.74 m (5' 8.5\"), weight 99.8 kg (220 lb), SpO2 97 %.  Body mass index is 32.96 kg/m .  General Appearance Adult:   Alert, no acute distress, oriented  HENT: throat/mouth:normal, good dentition  Lungs: no respiratory distress, or pursed lip breathing  Heart: No obvious jugular venous distension present  Abdomen: soft, nontender, no organomegaly or masses  Musculoskeltal: extremities normal, no peripheral edema  Skin: no suspicious lesions or rashes  Neuro: Alert, oriented, speech and mentation normal  Psych: affect and mood normal  Gait: Normal  : enlarged >80 gram prostate, benign feeling    PVR 42 ml    All pertinent imaging reviewed:    All imaging studies reviewed by me.  CT abd pelvis 6/21/2021      117 ml prostate by my estimate given the large median lobe      MRI prostate 10/19/2015    59 ml prostate estimated      PRE-PROCEDURE DIAGNOSIS: BPH with nocturia    POST-PROCEDURE DIAGNOSIS: BPH with nocturia    PROCEDURE: Cystoscopy    DESCRIPTION OF PROCEDURE: After informed consent was obtained, " the patient was brought to the procedure room where he was placed in the supine position with all pressure points well padded.  The penis was prepped and draped in sterile fashion. A flexible cystoscope was introduced through a well-lubricated urethra.     Normal anterior urethra. Obstructive trilobar hypertrophy with very large intravesical median lobe. ~5 cm prostatic urethral length. Bladder with moderate trabeculation and a few cellules, no diverticulae. No bladder stones. No concerning urothelial leesions.    ASSESSMENT and PLAN  75 year old male who presents with a history of BPH with nocturia, UTI, elevated PSA. Chronic lower urinary tract symptoms are worsening despite medical therapy with alpha blocker. He had discontinued 5-GABRIELLA in the past due to sexual side effects. He has a very large prostate on exam and by CT estimate with a prominent intravesical median lobe. He also has the sequleae of obstruction with trabeculation and cellules, and has recently had a UTI.    His PSA has been rising. Prior to offering outlet procedure will get an MRI to reassess the prostate. If there is a concerning lesion will need biopsy. If no lesion, will discuss outlet procedure (Greenlight PVP vs. Robotic simple prostatectomy vs. Referral for HOLEP vs. Rezum off label). Bladder scan today is low at 42 ml.    - Continue alfuzosin  - Restart finasteride  Prostate MRI - if concerning lesion (PIRADS 3,4,5), plan for [place] MRI TRUS fusion prostate biopsy  If no concerning lesion (PIRADS 1,2), return to discuss outlet procedure    Bebeto Cardoza MD   [facility]  [facility] Phone: [phone]

## 2021-08-03 ENCOUNTER — ANCILLARY PROCEDURE (OUTPATIENT)
Dept: MRI IMAGING | Facility: CLINIC | Age: 76
End: 2021-08-03
Attending: STUDENT IN AN ORGANIZED HEALTH CARE EDUCATION/TRAINING PROGRAM
Payer: COMMERCIAL

## 2021-08-03 DIAGNOSIS — R35.1 BENIGN PROSTATIC HYPERPLASIA WITH NOCTURIA: ICD-10-CM

## 2021-08-03 DIAGNOSIS — R97.20 ELEVATED PROSTATE SPECIFIC ANTIGEN (PSA): ICD-10-CM

## 2021-08-03 DIAGNOSIS — N40.1 BENIGN PROSTATIC HYPERPLASIA WITH NOCTURIA: ICD-10-CM

## 2021-08-03 PROCEDURE — A9585 GADOBUTROL INJECTION: HCPCS | Performed by: RADIOLOGY

## 2021-08-03 PROCEDURE — 72197 MRI PELVIS W/O & W/DYE: CPT | Performed by: RADIOLOGY

## 2021-08-03 RX ORDER — GADOBUTROL 604.72 MG/ML
10 INJECTION INTRAVENOUS ONCE
Status: COMPLETED | OUTPATIENT
Start: 2021-08-03 | End: 2021-08-03

## 2021-08-03 RX ADMIN — GADOBUTROL 10 ML: 604.72 INJECTION INTRAVENOUS at 16:51

## 2021-08-03 NOTE — DISCHARGE INSTRUCTIONS
MRI Contrast Discharge Instructions    The IV contrast you received today will pass out of your body in your  urine. This will happen in the next 24 hours. You will not feel this process.  Your urine will not change color.    Drink at least 4 extra glasses of water or juice today (unless your doctor  has restricted your fluids). This reduces the stress on your kidneys.  You may take your regular medicines.    If you are on dialysis: It is best to have dialysis today.    If you have a reaction: Most reactions happen right away. If you have  any new symptoms after leaving the hospital (such as hives or swelling),  call your hospital at the correct number below. Or call your family doctor.  If you have breathing distress or wheezing, call 911.    Special instructions: ***    I have read and understand the above information.    Signature:______________________________________ Date:___________    Staff:__________________________________________ Date:___________     Time:__________    [facility] Departments:    ___[place]: [phone]  ___[facility]: [phone]  ___[place]: [phone] ___[facility]: [phone]  ___[facility]: [phone]  ___[facility]: [phone]  ___[phone]  ___[facility]: [phone]  ___[phone]

## 2021-09-04 ENCOUNTER — HEALTH MAINTENANCE LETTER (OUTPATIENT)
Age: 76
End: 2021-09-04

## 2021-11-01 ENCOUNTER — APPOINTMENT (OUTPATIENT)
Dept: URBAN - METROPOLITAN AREA CLINIC 256 | Age: 76
Setting detail: DERMATOLOGY
End: 2021-11-01

## 2021-11-01 ENCOUNTER — OFFICE VISIT (OUTPATIENT)
Dept: UROLOGY | Facility: CLINIC | Age: 76
End: 2021-11-01
Payer: COMMERCIAL

## 2021-11-01 VITALS — RESPIRATION RATE: 15 BRPM | HEIGHT: 69 IN | WEIGHT: 225 LBS

## 2021-11-01 VITALS
WEIGHT: 225 LBS | HEART RATE: 84 BPM | OXYGEN SATURATION: 98 % | BODY MASS INDEX: 33.33 KG/M2 | DIASTOLIC BLOOD PRESSURE: 70 MMHG | SYSTOLIC BLOOD PRESSURE: 130 MMHG | HEIGHT: 69 IN

## 2021-11-01 DIAGNOSIS — L70.0 ACNE VULGARIS: ICD-10-CM

## 2021-11-01 DIAGNOSIS — R97.20 ELEVATED PROSTATE SPECIFIC ANTIGEN (PSA): ICD-10-CM

## 2021-11-01 DIAGNOSIS — L57.8 OTHER SKIN CHANGES DUE TO CHRONIC EXPOSURE TO NONIONIZING RADIATION: ICD-10-CM

## 2021-11-01 DIAGNOSIS — D22 MELANOCYTIC NEVI: ICD-10-CM

## 2021-11-01 DIAGNOSIS — Z87.440 HISTORY OF UTI: Primary | ICD-10-CM

## 2021-11-01 DIAGNOSIS — D18.0 HEMANGIOMA: ICD-10-CM

## 2021-11-01 DIAGNOSIS — L71.8 OTHER ROSACEA: ICD-10-CM

## 2021-11-01 DIAGNOSIS — L82.1 OTHER SEBORRHEIC KERATOSIS: ICD-10-CM

## 2021-11-01 DIAGNOSIS — L81.4 OTHER MELANIN HYPERPIGMENTATION: ICD-10-CM

## 2021-11-01 PROBLEM — D18.01 HEMANGIOMA OF SKIN AND SUBCUTANEOUS TISSUE: Status: ACTIVE | Noted: 2021-11-01

## 2021-11-01 PROBLEM — D22.4 MELANOCYTIC NEVI OF SCALP AND NECK: Status: ACTIVE | Noted: 2021-11-01

## 2021-11-01 PROBLEM — D22.9 MELANOCYTIC NEVI, UNSPECIFIED: Status: ACTIVE | Noted: 2021-11-01

## 2021-11-01 PROBLEM — D23.72 OTHER BENIGN NEOPLASM OF SKIN OF LEFT LOWER LIMB, INCLUDING HIP: Status: ACTIVE | Noted: 2021-11-01

## 2021-11-01 LAB
ALBUMIN UR-MCNC: NEGATIVE MG/DL
APPEARANCE UR: CLEAR
BILIRUB UR QL STRIP: ABNORMAL
COLOR UR AUTO: YELLOW
GLUCOSE UR STRIP-MCNC: NEGATIVE MG/DL
HGB UR QL STRIP: NEGATIVE
KETONES UR STRIP-MCNC: ABNORMAL MG/DL
LEUKOCYTE ESTERASE UR QL STRIP: NEGATIVE
NITRATE UR QL: NEGATIVE
PH UR STRIP: 5 [PH] (ref 5–7)
PSA SERPL-MCNC: 6 UG/L (ref 0–4)
RESIDUAL VOLUME (RV) (EXTERNAL): 25
SP GR UR STRIP: >=1.03 (ref 1–1.03)
UROBILINOGEN UR STRIP-ACNC: 0.2 E.U./DL

## 2021-11-01 PROCEDURE — 81003 URINALYSIS AUTO W/O SCOPE: CPT | Mod: QW | Performed by: STUDENT IN AN ORGANIZED HEALTH CARE EDUCATION/TRAINING PROGRAM

## 2021-11-01 PROCEDURE — OTHER COUNSELING: OTHER

## 2021-11-01 PROCEDURE — 99214 OFFICE O/P EST MOD 30 MIN: CPT

## 2021-11-01 PROCEDURE — 51798 US URINE CAPACITY MEASURE: CPT | Performed by: STUDENT IN AN ORGANIZED HEALTH CARE EDUCATION/TRAINING PROGRAM

## 2021-11-01 PROCEDURE — 36415 COLL VENOUS BLD VENIPUNCTURE: CPT | Performed by: STUDENT IN AN ORGANIZED HEALTH CARE EDUCATION/TRAINING PROGRAM

## 2021-11-01 PROCEDURE — 84153 ASSAY OF PSA TOTAL: CPT | Performed by: STUDENT IN AN ORGANIZED HEALTH CARE EDUCATION/TRAINING PROGRAM

## 2021-11-01 PROCEDURE — 99213 OFFICE O/P EST LOW 20 MIN: CPT | Mod: 25 | Performed by: STUDENT IN AN ORGANIZED HEALTH CARE EDUCATION/TRAINING PROGRAM

## 2021-11-01 PROCEDURE — OTHER PRESCRIPTION: OTHER

## 2021-11-01 RX ORDER — BLOOD SUGAR DIAGNOSTIC
STRIP MISCELLANEOUS
COMMUNITY
Start: 2021-10-23

## 2021-11-01 RX ORDER — BLOOD-GLUCOSE METER
EACH MISCELLANEOUS
COMMUNITY
Start: 2021-06-04

## 2021-11-01 RX ORDER — METRONIDAZOLE 7.5 MG/G
GEL TOPICAL BID
Qty: 45 | Refills: 3 | Status: ERX | COMMUNITY
Start: 2021-11-01

## 2021-11-01 RX ORDER — LANCETS 30 GAUGE
EACH MISCELLANEOUS
COMMUNITY
Start: 2021-07-22

## 2021-11-01 ASSESSMENT — LOCATION SIMPLE DESCRIPTION DERM
LOCATION SIMPLE: CHEST
LOCATION SIMPLE: RIGHT UPPER BACK
LOCATION SIMPLE: LEFT UPPER BACK
LOCATION SIMPLE: RIGHT CHEEK
LOCATION SIMPLE: LEFT CHEEK
LOCATION SIMPLE: POSTERIOR SCALP

## 2021-11-01 ASSESSMENT — LOCATION ZONE DERM
LOCATION ZONE: SCALP
LOCATION ZONE: TRUNK
LOCATION ZONE: FACE

## 2021-11-01 ASSESSMENT — MIFFLIN-ST. JEOR: SCORE: 1743.03

## 2021-11-01 ASSESSMENT — LOCATION DETAILED DESCRIPTION DERM
LOCATION DETAILED: RIGHT LATERAL SUPERIOR CHEST
LOCATION DETAILED: RIGHT SUPERIOR LATERAL MALAR CHEEK
LOCATION DETAILED: LEFT LATERAL SUPERIOR CHEST
LOCATION DETAILED: RIGHT SUPERIOR LATERAL UPPER BACK
LOCATION DETAILED: LEFT SUPERIOR LATERAL UPPER BACK
LOCATION DETAILED: POSTERIOR MID-PARIETAL SCALP
LOCATION DETAILED: LEFT CENTRAL MALAR CHEEK

## 2021-11-01 ASSESSMENT — PAIN SCALES - GENERAL: PAINLEVEL: NO PAIN (0)

## 2021-11-01 NOTE — PROGRESS NOTES
"CHIEF COMPLAINT   Monster Olmedo who is a 75 year old male returns today for follow-up of BPH with nocturia, UTI, elevated PSA.      HPI   Monster Olmedo is a 75 year old male who presents with a history of BPH with nocturia, UTI, elevated PSA.      Large 114ml obstructive prostate on cystoscopy (prominent intravesical median lobe) and imaging. MRI 8/3/2021 was PIRADS 2.    He was started back on finasteride at last visit 6/30/2021 and kept on alfuzosin    PSA today 6.0 ng/ml, which is decreasing from 7.58 ng/ml prior on 10/21/2020 after initiation of finasteride    Says his urinary symptoms are \"much better\" after restarting finasteride    AUA symptom score 7-6-0-5-4-1-3 = 28 severe QOL unhappy.    Is planning on going to [place] after Thanksgiving. Is worried about the drive down there    PHYSICAL EXAM  Patient is a 75 year old  male   Vitals: Blood pressure 130/70, pulse 84, height 1.74 m (5' 8.5\"), weight 102.1 kg (225 lb), SpO2 98 %.  Body mass index is 33.71 kg/m .  General Appearance Adult:   Alert, no acute distress, oriented  HENT: throat/mouth:normal, good dentition  Lungs: no respiratory distress, or pursed lip breathing  Heart: No obvious jugular venous distension present  Abdomen: nondistended  Musculoskeltal: extremities normal, no peripheral edema  Skin: no suspicious lesions or rashes  Neuro: Alert, oriented, speech and mentation normal  Psych: affect and mood normal  Gait: Normal  : deferred (last HUYEN was 6/30/2021 with large benign prostate)    All pertinent imaging reviewed:    MRI 8/3/2021  IMPRESSION:  1. Based on the most suspicious abnormality, this exam is  characterized as PIRADS 2 - Clinically significant cancer is unlikely  to be present.    2. No suspicious adenopathy or evidence of pelvic metastases.    IMPRESSION:  1. Based on the most suspicious abnormality, this exam is  characterized as PIRADS 2 - Clinically significant cancer is unlikely  to be present.    2. " No suspicious adenopathy or evidence of pelvic metastases.    PVR 25 ml today    Component      Latest Ref Rng & Units 5/10/2017 11/1/2021   PSA Diag Urologic Phys      0.00 - 4.00 ng/mL 3.30    PSA      0.00 - 4.00 ug/L  6.00 (H)       ASSESSMENT and PLAN  75 year old male who presents with a history of BPH with nocturia, UTI, elevated PSA.  Large 114 ml prostate on MRI.    PSA with appropriate reduction after starting finasteride    Still having severe urinary symptoms despite dual medical therapy with alpha blocker and 5-GABRIELLA. Emptying well based on PVR 25 ml    Recommend outlet procedure prior to any potential right hip replacement due to risk of possible infection of the prosthetic and postop urinary retention. I discussed multiple options given the size of his prostate. He wants to continue medical therapy for now and consider his options, likely procedure in Spring 2022    Patient instructions  Discussed potential outlet procedures including:    Rezum (water vapor thermal therapy)  Greenlight photovaporization of the prostate (PVP)  Holmium laser enucleation of the prostate (HOLEP)  Robotic assisted laparoscopic simple prostatectomy    You can use condom catheters for comfort during your long drive to [place]      Bebeto Cardoza MD   [facility]  [facility] Phone: [phone]

## 2021-11-01 NOTE — NURSING NOTE
Chief Complaint   Patient presents with     Hx of UTI     Benign Prostatic Hypertrophy     Nocturia     UA,PVR,PSA,AUA     PVR scan was 25ml today        Freya Conteh

## 2021-11-01 NOTE — PATIENT INSTRUCTIONS
Discussed potential outlet procedures including:    Rezum (water vapor thermal therapy)  Greenlight photovaporization of the prostate (PVP)  Holmium laser enucleation of the prostate (HOLEP)  Robotic assisted laparoscopic simple prostatectomy    You can use condom catheters for comfort during your long drive to [place]

## 2021-11-01 NOTE — LETTER
"11/1/2021       RE: Monster Olmedo  [address]  [place] [address]     Dear Colleague,    Thank you for referring your patient, Monster Olmedo, to the [facility] at [facility]. Please see a copy of my visit note below.    CHIEF COMPLAINT   Monster Olmedo who is a 75 year old male returns today for follow-up of BPH with nocturia, UTI, elevated PSA.      HPI   Monster Olmedo is a 75 year old male who presents with a history of BPH with nocturia, UTI, elevated PSA.      Large 114ml obstructive prostate on cystoscopy (prominent intravesical median lobe) and imaging. MRI 8/3/2021 was PIRADS 2.    He was started back on finasteride at last visit 6/30/2021 and kept on alfuzosin    PSA today 6.0 ng/ml, which is decreasing from 7.58 ng/ml prior on 10/21/2020 after initiation of finasteride    Says his urinary symptoms are \"much better\" after restarting finasteride    AUA symptom score 6-4-8-5-4-1-3 = 28 severe QOL unhappy.    Is planning on going to [place] after Thanksgiving. Is worried about the drive down there    PHYSICAL EXAM  Patient is a 75 year old  male   Vitals: Blood pressure 130/70, pulse 84, height 1.74 m (5' 8.5\"), weight 102.1 kg (225 lb), SpO2 98 %.  Body mass index is 33.71 kg/m .  General Appearance Adult:   Alert, no acute distress, oriented  HENT: throat/mouth:normal, good dentition  Lungs: no respiratory distress, or pursed lip breathing  Heart: No obvious jugular venous distension present  Abdomen: nondistended  Musculoskeltal: extremities normal, no peripheral edema  Skin: no suspicious lesions or rashes  Neuro: Alert, oriented, speech and mentation normal  Psych: affect and mood normal  Gait: Normal  : deferred (last HUYEN was 6/30/2021 with large benign prostate)    All pertinent imaging reviewed:    MRI 8/3/2021  IMPRESSION:  1. Based on the most suspicious abnormality, this exam " is  characterized as PIRADS 2 - Clinically significant cancer is unlikely  to be present.    2. No suspicious adenopathy or evidence of pelvic metastases.    IMPRESSION:  1. Based on the most suspicious abnormality, this exam is  characterized as PIRADS 2 - Clinically significant cancer is unlikely  to be present.    2. No suspicious adenopathy or evidence of pelvic metastases.    PVR 25 ml today    Component      Latest Ref Rng & Units 5/10/2017 11/1/2021   PSA Diag Urologic Phys      0.00 - 4.00 ng/mL 3.30    PSA      0.00 - 4.00 ug/L  6.00 (H)       ASSESSMENT and PLAN  75 year old male who presents with a history of BPH with nocturia, UTI, elevated PSA.  Large 114 ml prostate on MRI.    PSA with appropriate reduction after starting finasteride    Still having severe urinary symptoms despite dual medical therapy with alpha blocker and 5-GABRIELLA. Emptying well based on PVR 25 ml    Recommend outlet procedure prior to any potential right hip replacement due to risk of possible infection of the prosthetic and postop urinary retention. I discussed multiple options given the size of his prostate. He wants to continue medical therapy for now and consider his options, likely procedure in Spring 2022    Patient instructions  Discussed potential outlet procedures including:    Rezum (water vapor thermal therapy)  Greenlight photovaporization of the prostate (PVP)  Holmium laser enucleation of the prostate (HOLEP)  Robotic assisted laparoscopic simple prostatectomy    You can use condom catheters for comfort during your long drive to [place]      Bebeto Cardoza MD   [facility]rology  [facility] Phone: [phone]

## 2021-11-01 NOTE — PROCEDURE: COUNSELING
Birth Control Pills Counseling: Birth Control Pill Counseling: I discussed with the patient the potential side effects of OCPs including but not limited to increased risk of stroke, heart attack, thrombophlebitis, deep venous thrombosis, hepatic adenomas, breast changes, GI upset, headaches, and depression.  The patient verbalized understanding of the proper use and possible adverse effects of OCPs. All of the patient's questions and concerns were addressed.
Isotretinoin Pregnancy And Lactation Text: This medication is Pregnancy Category X and is considered extremely dangerous during pregnancy. It is unknown if it is excreted in breast milk.
Bactrim Pregnancy And Lactation Text: This medication is Pregnancy Category D and is known to cause fetal risk.  It is also excreted in breast milk.
Erythromycin Counseling:  I discussed with the patient the risks of erythromycin including but not limited to GI upset, allergic reaction, drug rash, diarrhea, increase in liver enzymes, and yeast infections.
Dapsone Counseling: I discussed with the patient the risks of dapsone including but not limited to hemolytic anemia, agranulocytosis, rashes, methemoglobinemia, kidney failure, peripheral neuropathy, headaches, GI upset, and liver toxicity.  Patients who start dapsone require monitoring including baseline LFTs and weekly CBCs for the first month, then every month thereafter.  The patient verbalized understanding of the proper use and possible adverse effects of dapsone.  All of the patient's questions and concerns were addressed.
Doxycycline Counseling:  Patient counseled regarding possible photosensitivity and increased risk for sunburn.  Patient instructed to avoid sunlight, if possible.  When exposed to sunlight, patients should wear protective clothing, sunglasses, and sunscreen.  The patient was instructed to call the office immediately if the following severe adverse effects occur:  hearing changes, easy bruising/bleeding, severe headache, or vision changes.  The patient verbalized understanding of the proper use and possible adverse effects of doxycycline.  All of the patient's questions and concerns were addressed.
Include Pregnancy/Lactation Warning?: No
Detail Level: Detailed
Isotretinoin Counseling: Patient should get monthly blood tests, not donate blood, not drive at night if vision affected, not share medication, and not undergo elective surgery for 6 months after tx completed. Side effects reviewed, pt to contact office should one occur.
Topical Clindamycin Counseling: Patient counseled that this medication may cause skin irritation or allergic reactions.  In the event of skin irritation, the patient was advised to reduce the amount of the drug applied or use it less frequently.   The patient verbalized understanding of the proper use and possible adverse effects of clindamycin.  All of the patient's questions and concerns were addressed.
Birth Control Pills Pregnancy And Lactation Text: This medication should be avoided if pregnant and for the first 30 days post-partum.
Sarecycline Counseling: Patient advised regarding possible photosensitivity and discoloration of the teeth, skin, lips, tongue and gums.  Patient instructed to avoid sunlight, if possible.  When exposed to sunlight, patients should wear protective clothing, sunglasses, and sunscreen.  The patient was instructed to call the office immediately if the following severe adverse effects occur:  hearing changes, easy bruising/bleeding, severe headache, or vision changes.  The patient verbalized understanding of the proper use and possible adverse effects of sarecycline.  All of the patient's questions and concerns were addressed.
Erythromycin Pregnancy And Lactation Text: This medication is Pregnancy Category B and is considered safe during pregnancy. It is also excreted in breast milk.
Spironolactone Pregnancy And Lactation Text: This medication can cause feminization of the male fetus and should be avoided during pregnancy. The active metabolite is also found in breast milk.
Topical Sulfur Applications Counseling: Topical Sulfur Counseling: Patient counseled that this medication may cause skin irritation or allergic reactions.  In the event of skin irritation, the patient was advised to reduce the amount of the drug applied or use it less frequently.   The patient verbalized understanding of the proper use and possible adverse effects of topical sulfur application.  All of the patient's questions and concerns were addressed.
Tetracycline Counseling: Patient counseled regarding possible photosensitivity and increased risk for sunburn.  Patient instructed to avoid sunlight, if possible.  When exposed to sunlight, patients should wear protective clothing, sunglasses, and sunscreen.  The patient was instructed to call the office immediately if the following severe adverse effects occur:  hearing changes, easy bruising/bleeding, severe headache, or vision changes.  The patient verbalized understanding of the proper use and possible adverse effects of tetracycline.  All of the patient's questions and concerns were addressed. Patient understands to avoid pregnancy while on therapy due to potential birth defects.
Dapsone Pregnancy And Lactation Text: This medication is Pregnancy Category C and is not considered safe during pregnancy or breast feeding.
Benzoyl Peroxide Counseling: Patient counseled that medicine may cause skin irritation and bleach clothing.  In the event of skin irritation, the patient was advised to reduce the amount of the drug applied or use it less frequently.   The patient verbalized understanding of the proper use and possible adverse effects of benzoyl peroxide.  All of the patient's questions and concerns were addressed.
Detail Level: Zone
Azithromycin Counseling:  I discussed with the patient the risks of azithromycin including but not limited to GI upset, allergic reaction, drug rash, diarrhea, and yeast infections.
Tazorac Counseling:  Patient advised that medication is irritating and drying.  Patient may need to apply sparingly and wash off after an hour before eventually leaving it on overnight.  The patient verbalized understanding of the proper use and possible adverse effects of tazorac.  All of the patient's questions and concerns were addressed.
Topical Retinoid counseling:  Patient advised to apply a pea-sized amount only at bedtime and wait 30 minutes after washing their face before applying.  If too drying, patient may add a non-comedogenic moisturizer. The patient verbalized understanding of the proper use and possible adverse effects of retinoids.  All of the patient's questions and concerns were addressed.
Minocycline Counseling: Patient advised regarding possible photosensitivity and discoloration of the teeth, skin, lips, tongue and gums.  Patient instructed to avoid sunlight, if possible.  When exposed to sunlight, patients should wear protective clothing, sunglasses, and sunscreen.  The patient was instructed to call the office immediately if the following severe adverse effects occur:  hearing changes, easy bruising/bleeding, severe headache, or vision changes.  The patient verbalized understanding of the proper use and possible adverse effects of minocycline.  All of the patient's questions and concerns were addressed.
Minocycline Pregnancy And Lactation Text: This medication is Pregnancy Category D and not consider safe during pregnancy. It is also excreted in breast milk.
Bactrim Counseling:  I discussed with the patient the risks of sulfa antibiotics including but not limited to GI upset, allergic reaction, drug rash, diarrhea, dizziness, photosensitivity, and yeast infections.  Rarely, more serious reactions can occur including but not limited to aplastic anemia, agranulocytosis, methemoglobinemia, blood dyscrasias, liver or kidney failure, lung infiltrates or desquamative/blistering drug rashes.
High Dose Vitamin A Pregnancy And Lactation Text: High dose vitamin A therapy is contraindicated during pregnancy and breast feeding.
Doxycycline Pregnancy And Lactation Text: This medication is Pregnancy Category D and not consider safe during pregnancy. It is also excreted in breast milk but is considered safe for shorter treatment courses.
High Dose Vitamin A Counseling: Side effects reviewed, pt to contact office should one occur.
Topical Sulfur Applications Pregnancy And Lactation Text: This medication is Pregnancy Category C and has an unknown safety profile during pregnancy. It is unknown if this topical medication is excreted in breast milk.
Tazorac Pregnancy And Lactation Text: This medication is not safe during pregnancy. It is unknown if this medication is excreted in breast milk.
Benzoyl Peroxide Pregnancy And Lactation Text: This medication is Pregnancy Category C. It is unknown if benzoyl peroxide is excreted in breast milk.
Azithromycin Pregnancy And Lactation Text: This medication is considered safe during pregnancy and is also secreted in breast milk.
Spironolactone Counseling: Patient advised regarding risks of diarrhea, abdominal pain, hyperkalemia, birth defects (for female patients), liver toxicity and renal toxicity. The patient may need blood work to monitor liver and kidney function and potassium levels while on therapy. The patient verbalized understanding of the proper use and possible adverse effects of spironolactone.  All of the patient's questions and concerns were addressed.
Topical Clindamycin Pregnancy And Lactation Text: This medication is Pregnancy Category B and is considered safe during pregnancy. It is unknown if it is excreted in breast milk.
Topical Retinoid Pregnancy And Lactation Text: This medication is Pregnancy Category C. It is unknown if this medication is excreted in breast milk.
Detail Level: Generalized

## 2021-11-11 ENCOUNTER — APPOINTMENT (OUTPATIENT)
Dept: URBAN - METROPOLITAN AREA CLINIC 256 | Age: 76
Setting detail: DERMATOLOGY
End: 2021-11-11

## 2021-11-11 VITALS — RESPIRATION RATE: 14 BRPM | WEIGHT: 230 LBS | HEIGHT: 69 IN

## 2021-11-11 DIAGNOSIS — L85.3 XEROSIS CUTIS: ICD-10-CM

## 2021-11-11 DIAGNOSIS — L30.9 DERMATITIS, UNSPECIFIED: ICD-10-CM

## 2021-11-11 DIAGNOSIS — L70.0 ACNE VULGARIS: ICD-10-CM

## 2021-11-11 PROCEDURE — OTHER COUNSELING: OTHER

## 2021-11-11 PROCEDURE — 99213 OFFICE O/P EST LOW 20 MIN: CPT

## 2021-11-11 PROCEDURE — OTHER PRESCRIPTION: OTHER

## 2021-11-11 PROCEDURE — OTHER ADDITIONAL NOTES: OTHER

## 2021-11-11 RX ORDER — TRIAMCINOLONE ACETONIDE 1 MG/G
CREAM TOPICAL BID
Qty: 454 | Refills: 3 | Status: ERX | COMMUNITY
Start: 2021-11-11

## 2021-11-11 ASSESSMENT — LOCATION ZONE DERM
LOCATION ZONE: EAR
LOCATION ZONE: TRUNK

## 2021-11-11 ASSESSMENT — LOCATION DETAILED DESCRIPTION DERM
LOCATION DETAILED: RIGHT LATERAL ABDOMEN
LOCATION DETAILED: RIGHT CYMBA CONCHA
LOCATION DETAILED: RIGHT SUPERIOR MEDIAL UPPER BACK

## 2021-11-11 ASSESSMENT — LOCATION SIMPLE DESCRIPTION DERM
LOCATION SIMPLE: RIGHT EAR
LOCATION SIMPLE: RIGHT UPPER BACK
LOCATION SIMPLE: ABDOMEN

## 2021-11-11 NOTE — HPI: RASH
How Severe Is Your Rash?: moderate
Is This A New Presentation, Or A Follow-Up?: Rash
Additional History: Patient has utilized hydrocortisone cream and calamin lotion. He notes an increase in itching at night. He denies any new detergents or foods in his life.

## 2021-11-11 NOTE — PROCEDURE: ADDITIONAL NOTES
Render Risk Assessment In Note?: no
Detail Level: Zone
Additional Notes: Patient notes he has been going to the pool often and has been utilizing lotion where he can reach. Patient advised to continue with moisturizing.

## 2021-11-11 NOTE — PROCEDURE: COUNSELING
Topical Sulfur Applications Pregnancy And Lactation Text: This medication is Pregnancy Category C and has an unknown safety profile during pregnancy. It is unknown if this topical medication is excreted in breast milk.
Minocycline Counseling: Patient advised regarding possible photosensitivity and discoloration of the teeth, skin, lips, tongue and gums.  Patient instructed to avoid sunlight, if possible.  When exposed to sunlight, patients should wear protective clothing, sunglasses, and sunscreen.  The patient was instructed to call the office immediately if the following severe adverse effects occur:  hearing changes, easy bruising/bleeding, severe headache, or vision changes.  The patient verbalized understanding of the proper use and possible adverse effects of minocycline.  All of the patient's questions and concerns were addressed.
Topical Clindamycin Pregnancy And Lactation Text: This medication is Pregnancy Category B and is considered safe during pregnancy. It is unknown if it is excreted in breast milk.
Benzoyl Peroxide Counseling: Patient counseled that medicine may cause skin irritation and bleach clothing.  In the event of skin irritation, the patient was advised to reduce the amount of the drug applied or use it less frequently.   The patient verbalized understanding of the proper use and possible adverse effects of benzoyl peroxide.  All of the patient's questions and concerns were addressed.
Include Pregnancy/Lactation Warning?: No
Dapsone Counseling: I discussed with the patient the risks of dapsone including but not limited to hemolytic anemia, agranulocytosis, rashes, methemoglobinemia, kidney failure, peripheral neuropathy, headaches, GI upset, and liver toxicity.  Patients who start dapsone require monitoring including baseline LFTs and weekly CBCs for the first month, then every month thereafter.  The patient verbalized understanding of the proper use and possible adverse effects of dapsone.  All of the patient's questions and concerns were addressed.
Azithromycin Counseling:  I discussed with the patient the risks of azithromycin including but not limited to GI upset, allergic reaction, drug rash, diarrhea, and yeast infections.
Sarecycline Pregnancy And Lactation Text: This medication is Pregnancy Category D and not consider safe during pregnancy. It is also excreted in breast milk.
High Dose Vitamin A Counseling: Side effects reviewed, pt to contact office should one occur.
Benzoyl Peroxide Pregnancy And Lactation Text: This medication is Pregnancy Category C. It is unknown if benzoyl peroxide is excreted in breast milk.
Detail Level: Zone
Isotretinoin Pregnancy And Lactation Text: This medication is Pregnancy Category X and is considered extremely dangerous during pregnancy. It is unknown if it is excreted in breast milk.
Sarecycline Counseling: Patient advised regarding possible photosensitivity and discoloration of the teeth, skin, lips, tongue and gums.  Patient instructed to avoid sunlight, if possible.  When exposed to sunlight, patients should wear protective clothing, sunglasses, and sunscreen.  The patient was instructed to call the office immediately if the following severe adverse effects occur:  hearing changes, easy bruising/bleeding, severe headache, or vision changes.  The patient verbalized understanding of the proper use and possible adverse effects of sarecycline.  All of the patient's questions and concerns were addressed.
Doxycycline Pregnancy And Lactation Text: This medication is Pregnancy Category D and not consider safe during pregnancy. It is also excreted in breast milk but is considered safe for shorter treatment courses.
Birth Control Pills Counseling: Birth Control Pill Counseling: I discussed with the patient the potential side effects of OCPs including but not limited to increased risk of stroke, heart attack, thrombophlebitis, deep venous thrombosis, hepatic adenomas, breast changes, GI upset, headaches, and depression.  The patient verbalized understanding of the proper use and possible adverse effects of OCPs. All of the patient's questions and concerns were addressed.
Birth Control Pills Pregnancy And Lactation Text: This medication should be avoided if pregnant and for the first 30 days post-partum.
Topical Clindamycin Counseling: Patient counseled that this medication may cause skin irritation or allergic reactions.  In the event of skin irritation, the patient was advised to reduce the amount of the drug applied or use it less frequently.   The patient verbalized understanding of the proper use and possible adverse effects of clindamycin.  All of the patient's questions and concerns were addressed.
Spironolactone Pregnancy And Lactation Text: This medication can cause feminization of the male fetus and should be avoided during pregnancy. The active metabolite is also found in breast milk.
Topical Retinoid Pregnancy And Lactation Text: This medication is Pregnancy Category C. It is unknown if this medication is excreted in breast milk.
Spironolactone Counseling: Patient advised regarding risks of diarrhea, abdominal pain, hyperkalemia, birth defects (for female patients), liver toxicity and renal toxicity. The patient may need blood work to monitor liver and kidney function and potassium levels while on therapy. The patient verbalized understanding of the proper use and possible adverse effects of spironolactone.  All of the patient's questions and concerns were addressed.
Doxycycline Counseling:  Patient counseled regarding possible photosensitivity and increased risk for sunburn.  Patient instructed to avoid sunlight, if possible.  When exposed to sunlight, patients should wear protective clothing, sunglasses, and sunscreen.  The patient was instructed to call the office immediately if the following severe adverse effects occur:  hearing changes, easy bruising/bleeding, severe headache, or vision changes.  The patient verbalized understanding of the proper use and possible adverse effects of doxycycline.  All of the patient's questions and concerns were addressed.
Isotretinoin Counseling: Patient should get monthly blood tests, not donate blood, not drive at night if vision affected, not share medication, and not undergo elective surgery for 6 months after tx completed. Side effects reviewed, pt to contact office should one occur.
Erythromycin Counseling:  I discussed with the patient the risks of erythromycin including but not limited to GI upset, allergic reaction, drug rash, diarrhea, increase in liver enzymes, and yeast infections.
Azithromycin Pregnancy And Lactation Text: This medication is considered safe during pregnancy and is also secreted in breast milk.
Topical Retinoid counseling:  Patient advised to apply a pea-sized amount only at bedtime and wait 30 minutes after washing their face before applying.  If too drying, patient may add a non-comedogenic moisturizer. The patient verbalized understanding of the proper use and possible adverse effects of retinoids.  All of the patient's questions and concerns were addressed.
Erythromycin Pregnancy And Lactation Text: This medication is Pregnancy Category B and is considered safe during pregnancy. It is also excreted in breast milk.
Dapsone Pregnancy And Lactation Text: This medication is Pregnancy Category C and is not considered safe during pregnancy or breast feeding.
Tazorac Pregnancy And Lactation Text: This medication is not safe during pregnancy. It is unknown if this medication is excreted in breast milk.
Bactrim Counseling:  I discussed with the patient the risks of sulfa antibiotics including but not limited to GI upset, allergic reaction, drug rash, diarrhea, dizziness, photosensitivity, and yeast infections.  Rarely, more serious reactions can occur including but not limited to aplastic anemia, agranulocytosis, methemoglobinemia, blood dyscrasias, liver or kidney failure, lung infiltrates or desquamative/blistering drug rashes.
Tazorac Counseling:  Patient advised that medication is irritating and drying.  Patient may need to apply sparingly and wash off after an hour before eventually leaving it on overnight.  The patient verbalized understanding of the proper use and possible adverse effects of tazorac.  All of the patient's questions and concerns were addressed.
High Dose Vitamin A Pregnancy And Lactation Text: High dose vitamin A therapy is contraindicated during pregnancy and breast feeding.
Bactrim Pregnancy And Lactation Text: This medication is Pregnancy Category D and is known to cause fetal risk.  It is also excreted in breast milk.
Tetracycline Counseling: Patient counseled regarding possible photosensitivity and increased risk for sunburn.  Patient instructed to avoid sunlight, if possible.  When exposed to sunlight, patients should wear protective clothing, sunglasses, and sunscreen.  The patient was instructed to call the office immediately if the following severe adverse effects occur:  hearing changes, easy bruising/bleeding, severe headache, or vision changes.  The patient verbalized understanding of the proper use and possible adverse effects of tetracycline.  All of the patient's questions and concerns were addressed. Patient understands to avoid pregnancy while on therapy due to potential birth defects.
Topical Sulfur Applications Counseling: Topical Sulfur Counseling: Patient counseled that this medication may cause skin irritation or allergic reactions.  In the event of skin irritation, the patient was advised to reduce the amount of the drug applied or use it less frequently.   The patient verbalized understanding of the proper use and possible adverse effects of topical sulfur application.  All of the patient's questions and concerns were addressed.
Detail Level: Detailed

## 2022-02-19 ENCOUNTER — HEALTH MAINTENANCE LETTER (OUTPATIENT)
Age: 77
End: 2022-02-19

## 2022-05-27 DIAGNOSIS — N40.1 BENIGN PROSTATIC HYPERPLASIA WITH NOCTURIA: ICD-10-CM

## 2022-05-27 DIAGNOSIS — R35.1 BENIGN PROSTATIC HYPERPLASIA WITH NOCTURIA: ICD-10-CM

## 2022-05-27 RX ORDER — FINASTERIDE 5 MG/1
5 TABLET, FILM COATED ORAL DAILY
Qty: 90 TABLET | Refills: 0 | Status: ON HOLD | OUTPATIENT
Start: 2022-05-27 | End: 2022-08-18

## 2022-06-22 ENCOUNTER — OFFICE VISIT (OUTPATIENT)
Dept: UROLOGY | Facility: CLINIC | Age: 77
End: 2022-06-22
Payer: COMMERCIAL

## 2022-06-22 VITALS
DIASTOLIC BLOOD PRESSURE: 70 MMHG | WEIGHT: 222 LBS | HEIGHT: 69 IN | BODY MASS INDEX: 32.88 KG/M2 | SYSTOLIC BLOOD PRESSURE: 130 MMHG

## 2022-06-22 DIAGNOSIS — R35.1 BPH ASSOCIATED WITH NOCTURIA: ICD-10-CM

## 2022-06-22 DIAGNOSIS — N40.1 BPH ASSOCIATED WITH NOCTURIA: ICD-10-CM

## 2022-06-22 DIAGNOSIS — R97.20 ELEVATED PROSTATE SPECIFIC ANTIGEN (PSA): Primary | ICD-10-CM

## 2022-06-22 LAB
ALBUMIN UR-MCNC: NEGATIVE MG/DL
APPEARANCE UR: CLEAR
BILIRUB UR QL STRIP: NEGATIVE
COLOR UR AUTO: YELLOW
GLUCOSE UR STRIP-MCNC: NEGATIVE MG/DL
HGB UR QL STRIP: NEGATIVE
KETONES UR STRIP-MCNC: NEGATIVE MG/DL
LEUKOCYTE ESTERASE UR QL STRIP: NEGATIVE
NITRATE UR QL: NEGATIVE
PH UR STRIP: 5.5 [PH] (ref 5–7)
PSA SERPL-MCNC: 6.2 UG/L (ref 0–4)
RESIDUAL VOLUME (RV) (EXTERNAL): 53
SP GR UR STRIP: 1.02 (ref 1–1.03)
UROBILINOGEN UR STRIP-ACNC: 0.2 E.U./DL

## 2022-06-22 PROCEDURE — 36415 COLL VENOUS BLD VENIPUNCTURE: CPT | Performed by: STUDENT IN AN ORGANIZED HEALTH CARE EDUCATION/TRAINING PROGRAM

## 2022-06-22 PROCEDURE — 84153 ASSAY OF PSA TOTAL: CPT | Performed by: STUDENT IN AN ORGANIZED HEALTH CARE EDUCATION/TRAINING PROGRAM

## 2022-06-22 PROCEDURE — 81003 URINALYSIS AUTO W/O SCOPE: CPT | Mod: QW | Performed by: STUDENT IN AN ORGANIZED HEALTH CARE EDUCATION/TRAINING PROGRAM

## 2022-06-22 PROCEDURE — 99213 OFFICE O/P EST LOW 20 MIN: CPT | Mod: 25 | Performed by: STUDENT IN AN ORGANIZED HEALTH CARE EDUCATION/TRAINING PROGRAM

## 2022-06-22 PROCEDURE — 51798 US URINE CAPACITY MEASURE: CPT | Performed by: STUDENT IN AN ORGANIZED HEALTH CARE EDUCATION/TRAINING PROGRAM

## 2022-06-22 RX ORDER — INSULIN GLARGINE 100 [IU]/ML
30 INJECTION, SOLUTION SUBCUTANEOUS AT BEDTIME
COMMUNITY
Start: 2022-06-03

## 2022-06-22 ASSESSMENT — PAIN SCALES - GENERAL: PAINLEVEL: NO PAIN (0)

## 2022-06-22 NOTE — NURSING NOTE
Chief Complaint   Patient presents with     Benign Prostatic Hypertrophy     AUA,PVR,UA,PSA     PVR scan was 53ml today    Freya Conteh

## 2022-06-22 NOTE — PATIENT INSTRUCTIONS
We again discussed potential bladder outlet procedures including Rezum, Greenlight PVP, referral for HOLEP, robotic assisted simple prostatectomy. He has a large 114 gram prostate. He has very high likelihood of going into urinary retention after his upcoming hip surgery 8/23/2022. I recommended that if he wants to proceed with an outlet procedure, that doing it before the hip surgery is probably preferable given that I am concerned he will be in retention after the surgery and that there is as theoretical risk of seeding the prosthetic with infection when  surgery is done after implantation.    - continue flomax and finasteride  - consider bladder outlet procedure

## 2022-06-22 NOTE — PROGRESS NOTES
"CHIEF COMPLAINT   Monster Olmedo who is a 76 year old male returns today for follow-up of BPH with nocturia, UTI, elevated PSA.      HPI   Monster Olmedo is a 76 year old male who presents with a history of BPH with nocturia, UTI, elevated PSA.      Very large obstructive prostate, severe symptoms despite 5ARI and alpha blocker    He is wearing depends. He is having urge urinary incontinence and sometimes just leaks on his own, or has post void dribbling. He feels like he doesn't empty completely    AUA symptom score 6-8-5-4-4-2-3 = 26 QOL unhappy    PHYSICAL EXAM  Patient is a 76 year old  male   Vitals: Blood pressure 130/70, height 1.74 m (5' 8.5\"), weight 100.7 kg (222 lb).  Body mass index is 33.26 kg/m .  General Appearance Adult:   Alert, no acute distress, oriented  HENT: throat/mouth:normal, good dentition  Lungs: no respiratory distress, or pursed lip breathing  Heart: No obvious jugular venous distension present  Abdomen: soft, nontender, no organomegaly or masses  Musculoskeltal: extremities normal, no peripheral edema  Skin: no suspicious lesions or rashes  Neuro: Alert, oriented, speech and mentation normal  Psych: affect and mood normal  Gait: Normal    All pertinent imaging reviewed:    MRI prostate 8/3/2021  Size: 114 grams    IMPRESSION:  1. Based on the most suspicious abnormality, this exam is  characterized as PIRADS 2 - Clinically significant cancer is unlikely  to be present.    2. No suspicious adenopathy or evidence of pelvic metastases.       Component      Latest Ref Rng & Units 11/1/2021 6/22/2022   PSA      0.00 - 4.00 ug/L 6.00 (H) 6.20 (H)       ASSESSMENT and PLAN  76 year old male returns today for follow-up of BPH with nocturia, UTI, elevated PSA.      Continues to have low PVR. I offered an anticholinergic or beta3 agonist but he is worried about potential urinary retention    PSA stable, no concerning rise    We again discussed potential bladder outlet procedures including " Meagan, Greenlight PVP, referral for HOLEP, robotic assisted simple prostatectomy. He has a large 114 gram prostate. He has very high likelihood of going into urinary retention after his upcoming hip surgery 8/23/2022. I recommended that if he wants to proceed with an outlet procedure, that doing it before the hip surgery is probably preferable given that I am concerned he will be in retention after the surgery and that there is as theoretical risk of seeding the prosthetic with infection when  surgery is done after implantation.    Re: Meagan, discussed expected 5-7 day post procedure catheterization. Most patients see symptom improvement in two weeks but can take up to three months for maximum benefit. Potential risks of Rezum include but are not limited to painful urination (dysuria), blood in the urine (hematuria), blood in the semen (hematospermia), decrease in ejaculatory volume, urinary tract infection (UTI), and urinary frequency, retention or urgency. Note the Rezum's indication is prostate <80 grams so this use in this large prostate would technically be off label, but recent literature has shown its effectiveness in large prostates    - continue flomax and finasteride  - consider bladder outlet procedure  - otherwise, follow up in 6 months with UA, PVR, AUA Symptom score, PSA    Bebeto Cardoza MD   [facility]  [facility] Phone: [phone]      *addendum* patient requesting to schedule Rezum but unable to accommodate in timely fashion, thus will proceed with Greenlight photovaporization of the prostate. Orders placed

## 2022-06-22 NOTE — LETTER
"6/22/2022     RE: Monster Olmedo  [address]  [place] [address]     Dear Colleague,    Thank you for referring your patient, Monster Olmedo, to the [facility] [place] [facility]. Please see a copy of my visit note below.    CHIEF COMPLAINT   Monster Olmedo who is a 76 year old male returns today for follow-up of BPH with nocturia, UTI, elevated PSA.      HPI   Monster Olmedo is a 76 year old male who presents with a history of BPH with nocturia, UTI, elevated PSA.      Very large obstructive prostate, severe symptoms despite 5ARI and alpha blocker    He is wearing depends. He is having urge urinary incontinence and sometimes just leaks on his own, or has post void dribbling. He feels like he doesn't empty completely    AUA symptom score 2-3-3-4-4-2-3 = 26 QOL unhappy    PHYSICAL EXAM  Patient is a 76 year old  male   Vitals: Blood pressure 130/70, height 1.74 m (5' 8.5\"), weight 100.7 kg (222 lb).  Body mass index is 33.26 kg/m .  General Appearance Adult:   Alert, no acute distress, oriented  HENT: throat/mouth:normal, good dentition  Lungs: no respiratory distress, or pursed lip breathing  Heart: No obvious jugular venous distension present  Abdomen: soft, nontender, no organomegaly or masses  Musculoskeltal: extremities normal, no peripheral edema  Skin: no suspicious lesions or rashes  Neuro: Alert, oriented, speech and mentation normal  Psych: affect and mood normal  Gait: Normal    All pertinent imaging reviewed:    MRI prostate 8/3/2021  Size: 114 grams    IMPRESSION:  1. Based on the most suspicious abnormality, this exam is  characterized as PIRADS 2 - Clinically significant cancer is unlikely  to be present.    2. No suspicious adenopathy or evidence of pelvic metastases.     Component      Latest Ref Rng & Units 11/1/2021 6/22/2022   PSA      0.00 - 4.00 ug/L 6.00 (H) 6.20 (H)       ASSESSMENT and PLAN  76 year old " male returns today for follow-up of BPH with nocturia, UTI, elevated PSA.      Continues to have low PVR. I offered an anticholinergic or beta3 agonist but he is worried about potential urinary retention    PSA stable, no concerning rise    We again discussed potential bladder outlet procedures including Rezum, Greenlight PVP, referral for HOLEP, robotic assisted simple prostatectomy. He has a large 114 gram prostate. He has very high likelihood of going into urinary retention after his upcoming hip surgery 8/23/2022. I recommended that if he wants to proceed with an outlet procedure, that doing it before the hip surgery is probably preferable given that I am concerned he will be in retention after the surgery and that there is as theoretical risk of seeding the prosthetic with infection when  surgery is done after implantation.    Re: Rezum, discussed expected 5-7 day post procedure catheterization. Most patients see symptom improvement in two weeks but can take up to three months for maximum benefit. Potential risks of Rezum include but are not limited to painful urination (dysuria), blood in the urine (hematuria), blood in the semen (hematospermia), decrease in ejaculatory volume, urinary tract infection (UTI), and urinary frequency, retention or urgency. Note the Rezum's indication is prostate <80 grams so this use in this large prostate would technically be off label, but recent literature has shown its effectiveness in large prostates    - continue flomax and finasteride  - consider bladder outlet procedure  - otherwise, follow up in 6 months with UA, PVR, AUA Symptom score, PSA    Bebeto Cardoza MD   [facility]  [facility] Phone: [phone]    *addendum* patient requesting

## 2022-08-16 RX ORDER — TRIAMCINOLONE ACETONIDE 1 MG/G
CREAM TOPICAL DAILY
COMMUNITY

## 2022-08-16 RX ORDER — METRONIDAZOLE 7.5 MG/G
GEL TOPICAL PRN
COMMUNITY

## 2022-08-16 RX ORDER — INSULIN LISPRO 100 [IU]/ML
5 INJECTION, SOLUTION INTRAVENOUS; SUBCUTANEOUS
COMMUNITY

## 2022-08-17 ENCOUNTER — ANESTHESIA (OUTPATIENT)
Dept: SURGERY | Facility: CLINIC | Age: 77
End: 2022-08-17
Payer: COMMERCIAL

## 2022-08-17 ENCOUNTER — ANESTHESIA EVENT (OUTPATIENT)
Dept: SURGERY | Facility: CLINIC | Age: 77
End: 2022-08-17
Payer: COMMERCIAL

## 2022-08-17 ENCOUNTER — HOSPITAL ENCOUNTER (OUTPATIENT)
Facility: CLINIC | Age: 77
Discharge: HOME | End: 2022-08-18
Attending: STUDENT IN AN ORGANIZED HEALTH CARE EDUCATION/TRAINING PROGRAM | Admitting: STUDENT IN AN ORGANIZED HEALTH CARE EDUCATION/TRAINING PROGRAM
Payer: COMMERCIAL

## 2022-08-17 DIAGNOSIS — N40.1 BENIGN PROSTATIC HYPERPLASIA WITH NOCTURIA: Primary | ICD-10-CM

## 2022-08-17 DIAGNOSIS — R35.1 BENIGN PROSTATIC HYPERPLASIA WITH NOCTURIA: Primary | ICD-10-CM

## 2022-08-17 LAB
ABO/RH(D): NORMAL
ANION GAP SERPL CALCULATED.3IONS-SCNC: 4 MMOL/L (ref 3–14)
ANTIBODY SCREEN: NEGATIVE
BUN SERPL-MCNC: 17 MG/DL (ref 7–30)
CALCIUM SERPL-MCNC: 9.1 MG/DL (ref 8.5–10.1)
CHLORIDE BLD-SCNC: 102 MMOL/L (ref 94–109)
CO2 SERPL-SCNC: 28 MMOL/L (ref 20–32)
CREAT SERPL-MCNC: 0.78 MG/DL (ref 0.66–1.25)
ERYTHROCYTE [DISTWIDTH] IN BLOOD BY AUTOMATED COUNT: 12.2 % (ref 10–15)
GFR SERPL CREATININE-BSD FRML MDRD: >90 ML/MIN/1.73M2
GLUCOSE BLD-MCNC: 173 MG/DL (ref 70–99)
GLUCOSE BLDC GLUCOMTR-MCNC: 187 MG/DL (ref 70–99)
GLUCOSE BLDC GLUCOMTR-MCNC: 209 MG/DL (ref 70–99)
GLUCOSE BLDC GLUCOMTR-MCNC: 266 MG/DL (ref 70–99)
HCT VFR BLD AUTO: 41.6 % (ref 40–53)
HGB BLD-MCNC: 13.7 G/DL (ref 13.3–17.7)
MCH RBC QN AUTO: 29.7 PG (ref 26.5–33)
MCHC RBC AUTO-ENTMCNC: 32.9 G/DL (ref 31.5–36.5)
MCV RBC AUTO: 90 FL (ref 78–100)
PLATELET # BLD AUTO: 238 10E3/UL (ref 150–450)
POTASSIUM BLD-SCNC: 4 MMOL/L (ref 3.4–5.3)
RBC # BLD AUTO: 4.62 10E6/UL (ref 4.4–5.9)
SODIUM SERPL-SCNC: 134 MMOL/L (ref 133–144)
SPECIMEN EXPIRATION DATE: NORMAL
WBC # BLD AUTO: 7 10E3/UL (ref 4–11)

## 2022-08-17 PROCEDURE — 250N000009 HC RX 250: Performed by: STUDENT IN AN ORGANIZED HEALTH CARE EDUCATION/TRAINING PROGRAM

## 2022-08-17 PROCEDURE — 360N000079 HC SURGERY LEVEL 6, PER MIN: Performed by: STUDENT IN AN ORGANIZED HEALTH CARE EDUCATION/TRAINING PROGRAM

## 2022-08-17 PROCEDURE — 258N000001 HC RX 258: Performed by: STUDENT IN AN ORGANIZED HEALTH CARE EDUCATION/TRAINING PROGRAM

## 2022-08-17 PROCEDURE — 52648 LASER SURGERY OF PROSTATE: CPT | Performed by: STUDENT IN AN ORGANIZED HEALTH CARE EDUCATION/TRAINING PROGRAM

## 2022-08-17 PROCEDURE — 258N000003 HC RX IP 258 OP 636: Performed by: ANESTHESIOLOGY

## 2022-08-17 PROCEDURE — 82962 GLUCOSE BLOOD TEST: CPT

## 2022-08-17 PROCEDURE — 250N000011 HC RX IP 250 OP 636: Performed by: ANESTHESIOLOGY

## 2022-08-17 PROCEDURE — 250N000013 HC RX MED GY IP 250 OP 250 PS 637: Performed by: STUDENT IN AN ORGANIZED HEALTH CARE EDUCATION/TRAINING PROGRAM

## 2022-08-17 PROCEDURE — 272N000001 HC OR GENERAL SUPPLY STERILE: Performed by: STUDENT IN AN ORGANIZED HEALTH CARE EDUCATION/TRAINING PROGRAM

## 2022-08-17 PROCEDURE — 96372 THER/PROPH/DIAG INJ SC/IM: CPT | Performed by: STUDENT IN AN ORGANIZED HEALTH CARE EDUCATION/TRAINING PROGRAM

## 2022-08-17 PROCEDURE — 710N000009 HC RECOVERY PHASE 1, LEVEL 1, PER MIN: Performed by: STUDENT IN AN ORGANIZED HEALTH CARE EDUCATION/TRAINING PROGRAM

## 2022-08-17 PROCEDURE — 370N000017 HC ANESTHESIA TECHNICAL FEE, PER MIN: Performed by: STUDENT IN AN ORGANIZED HEALTH CARE EDUCATION/TRAINING PROGRAM

## 2022-08-17 PROCEDURE — 250N000009 HC RX 250: Performed by: NURSE ANESTHETIST, CERTIFIED REGISTERED

## 2022-08-17 PROCEDURE — 250N000012 HC RX MED GY IP 250 OP 636 PS 637: Performed by: STUDENT IN AN ORGANIZED HEALTH CARE EDUCATION/TRAINING PROGRAM

## 2022-08-17 PROCEDURE — 36415 COLL VENOUS BLD VENIPUNCTURE: CPT | Performed by: STUDENT IN AN ORGANIZED HEALTH CARE EDUCATION/TRAINING PROGRAM

## 2022-08-17 PROCEDURE — 85027 COMPLETE CBC AUTOMATED: CPT | Performed by: STUDENT IN AN ORGANIZED HEALTH CARE EDUCATION/TRAINING PROGRAM

## 2022-08-17 PROCEDURE — 250N000011 HC RX IP 250 OP 636: Performed by: STUDENT IN AN ORGANIZED HEALTH CARE EDUCATION/TRAINING PROGRAM

## 2022-08-17 PROCEDURE — 86901 BLOOD TYPING SEROLOGIC RH(D): CPT | Performed by: STUDENT IN AN ORGANIZED HEALTH CARE EDUCATION/TRAINING PROGRAM

## 2022-08-17 PROCEDURE — 80048 BASIC METABOLIC PNL TOTAL CA: CPT | Performed by: STUDENT IN AN ORGANIZED HEALTH CARE EDUCATION/TRAINING PROGRAM

## 2022-08-17 PROCEDURE — 999N000141 HC STATISTIC PRE-PROCEDURE NURSING ASSESSMENT: Performed by: STUDENT IN AN ORGANIZED HEALTH CARE EDUCATION/TRAINING PROGRAM

## 2022-08-17 PROCEDURE — 250N000025 HC SEVOFLURANE, PER MIN: Performed by: STUDENT IN AN ORGANIZED HEALTH CARE EDUCATION/TRAINING PROGRAM

## 2022-08-17 PROCEDURE — 250N000011 HC RX IP 250 OP 636: Performed by: NURSE ANESTHETIST, CERTIFIED REGISTERED

## 2022-08-17 RX ORDER — LIDOCAINE HYDROCHLORIDE 20 MG/ML
INJECTION, SOLUTION INFILTRATION; PERINEURAL PRN
Status: DISCONTINUED | OUTPATIENT
Start: 2022-08-17 | End: 2022-08-17

## 2022-08-17 RX ORDER — FENTANYL CITRATE 50 UG/ML
INJECTION, SOLUTION INTRAMUSCULAR; INTRAVENOUS PRN
Status: DISCONTINUED | OUTPATIENT
Start: 2022-08-17 | End: 2022-08-17

## 2022-08-17 RX ORDER — LIDOCAINE 40 MG/G
CREAM TOPICAL
Status: DISCONTINUED | OUTPATIENT
Start: 2022-08-17 | End: 2022-08-18 | Stop reason: HOSPADM

## 2022-08-17 RX ORDER — ONDANSETRON 2 MG/ML
INJECTION INTRAMUSCULAR; INTRAVENOUS PRN
Status: DISCONTINUED | OUTPATIENT
Start: 2022-08-17 | End: 2022-08-17

## 2022-08-17 RX ORDER — PROPOFOL 10 MG/ML
INJECTION, EMULSION INTRAVENOUS PRN
Status: DISCONTINUED | OUTPATIENT
Start: 2022-08-17 | End: 2022-08-17

## 2022-08-17 RX ORDER — CEFAZOLIN SODIUM/WATER 2 G/20 ML
2 SYRINGE (ML) INTRAVENOUS SEE ADMIN INSTRUCTIONS
Status: DISCONTINUED | OUTPATIENT
Start: 2022-08-17 | End: 2022-08-17 | Stop reason: HOSPADM

## 2022-08-17 RX ORDER — ONDANSETRON 2 MG/ML
4 INJECTION INTRAMUSCULAR; INTRAVENOUS EVERY 30 MIN PRN
Status: DISCONTINUED | OUTPATIENT
Start: 2022-08-17 | End: 2022-08-17 | Stop reason: HOSPADM

## 2022-08-17 RX ORDER — NALOXONE HYDROCHLORIDE 0.4 MG/ML
0.2 INJECTION, SOLUTION INTRAMUSCULAR; INTRAVENOUS; SUBCUTANEOUS
Status: DISCONTINUED | OUTPATIENT
Start: 2022-08-17 | End: 2022-08-18 | Stop reason: HOSPADM

## 2022-08-17 RX ORDER — ALFUZOSIN HYDROCHLORIDE 10 MG/1
10 TABLET, EXTENDED RELEASE ORAL DAILY
Status: DISCONTINUED | OUTPATIENT
Start: 2022-08-17 | End: 2022-08-18 | Stop reason: HOSPADM

## 2022-08-17 RX ORDER — OXYCODONE HYDROCHLORIDE 5 MG/1
5 TABLET ORAL EVERY 4 HOURS PRN
Status: DISCONTINUED | OUTPATIENT
Start: 2022-08-17 | End: 2022-08-17 | Stop reason: HOSPADM

## 2022-08-17 RX ORDER — LABETALOL HYDROCHLORIDE 5 MG/ML
10 INJECTION, SOLUTION INTRAVENOUS
Status: DISCONTINUED | OUTPATIENT
Start: 2022-08-17 | End: 2022-08-17 | Stop reason: HOSPADM

## 2022-08-17 RX ORDER — DEXAMETHASONE SODIUM PHOSPHATE 4 MG/ML
INJECTION, SOLUTION INTRA-ARTICULAR; INTRALESIONAL; INTRAMUSCULAR; INTRAVENOUS; SOFT TISSUE PRN
Status: DISCONTINUED | OUTPATIENT
Start: 2022-08-17 | End: 2022-08-17

## 2022-08-17 RX ORDER — ONDANSETRON 4 MG/1
4 TABLET, ORALLY DISINTEGRATING ORAL EVERY 30 MIN PRN
Status: DISCONTINUED | OUTPATIENT
Start: 2022-08-17 | End: 2022-08-17 | Stop reason: HOSPADM

## 2022-08-17 RX ORDER — NALOXONE HYDROCHLORIDE 0.4 MG/ML
0.4 INJECTION, SOLUTION INTRAMUSCULAR; INTRAVENOUS; SUBCUTANEOUS
Status: DISCONTINUED | OUTPATIENT
Start: 2022-08-17 | End: 2022-08-18 | Stop reason: HOSPADM

## 2022-08-17 RX ORDER — OXYCODONE HYDROCHLORIDE 5 MG/1
5 TABLET ORAL EVERY 4 HOURS PRN
Status: DISCONTINUED | OUTPATIENT
Start: 2022-08-17 | End: 2022-08-18 | Stop reason: HOSPADM

## 2022-08-17 RX ORDER — DEXTROSE MONOHYDRATE 25 G/50ML
25-50 INJECTION, SOLUTION INTRAVENOUS
Status: DISCONTINUED | OUTPATIENT
Start: 2022-08-17 | End: 2022-08-18 | Stop reason: HOSPADM

## 2022-08-17 RX ORDER — CEFAZOLIN SODIUM/WATER 2 G/20 ML
2 SYRINGE (ML) INTRAVENOUS
Status: COMPLETED | OUTPATIENT
Start: 2022-08-17 | End: 2022-08-17

## 2022-08-17 RX ORDER — OXYCODONE HYDROCHLORIDE 5 MG/1
10 TABLET ORAL EVERY 4 HOURS PRN
Status: DISCONTINUED | OUTPATIENT
Start: 2022-08-17 | End: 2022-08-18 | Stop reason: HOSPADM

## 2022-08-17 RX ORDER — SODIUM CHLORIDE, SODIUM LACTATE, POTASSIUM CHLORIDE, CALCIUM CHLORIDE 600; 310; 30; 20 MG/100ML; MG/100ML; MG/100ML; MG/100ML
INJECTION, SOLUTION INTRAVENOUS CONTINUOUS
Status: DISCONTINUED | OUTPATIENT
Start: 2022-08-17 | End: 2022-08-17 | Stop reason: HOSPADM

## 2022-08-17 RX ORDER — NICOTINE POLACRILEX 4 MG
15-30 LOZENGE BUCCAL
Status: DISCONTINUED | OUTPATIENT
Start: 2022-08-17 | End: 2022-08-18 | Stop reason: HOSPADM

## 2022-08-17 RX ORDER — FENTANYL CITRATE 50 UG/ML
25 INJECTION, SOLUTION INTRAMUSCULAR; INTRAVENOUS ONCE
Status: COMPLETED | OUTPATIENT
Start: 2022-08-17 | End: 2022-08-17

## 2022-08-17 RX ORDER — HYDROMORPHONE HCL IN WATER/PF 6 MG/30 ML
0.2 PATIENT CONTROLLED ANALGESIA SYRINGE INTRAVENOUS EVERY 5 MIN PRN
Status: DISCONTINUED | OUTPATIENT
Start: 2022-08-17 | End: 2022-08-17 | Stop reason: HOSPADM

## 2022-08-17 RX ORDER — FENTANYL CITRATE 0.05 MG/ML
25 INJECTION, SOLUTION INTRAMUSCULAR; INTRAVENOUS EVERY 5 MIN PRN
Status: DISCONTINUED | OUTPATIENT
Start: 2022-08-17 | End: 2022-08-17 | Stop reason: HOSPADM

## 2022-08-17 RX ORDER — ACETAMINOPHEN 325 MG/1
650 TABLET ORAL EVERY 6 HOURS PRN
Status: DISCONTINUED | OUTPATIENT
Start: 2022-08-17 | End: 2022-08-18 | Stop reason: HOSPADM

## 2022-08-17 RX ORDER — ATROPA BELLADONNA AND OPIUM 16.2; 3 MG/1; MG/1
SUPPOSITORY RECTAL PRN
Status: DISCONTINUED | OUTPATIENT
Start: 2022-08-17 | End: 2022-08-17 | Stop reason: HOSPADM

## 2022-08-17 RX ORDER — FUROSEMIDE 10 MG/ML
INJECTION INTRAMUSCULAR; INTRAVENOUS PRN
Status: DISCONTINUED | OUTPATIENT
Start: 2022-08-17 | End: 2022-08-17

## 2022-08-17 RX ADMIN — FENTANYL CITRATE 50 MCG: 50 INJECTION, SOLUTION INTRAMUSCULAR; INTRAVENOUS at 14:00

## 2022-08-17 RX ADMIN — FENTANYL CITRATE 25 MCG: 50 INJECTION, SOLUTION INTRAMUSCULAR; INTRAVENOUS at 16:38

## 2022-08-17 RX ADMIN — SODIUM CHLORIDE, POTASSIUM CHLORIDE, SODIUM LACTATE AND CALCIUM CHLORIDE: 600; 310; 30; 20 INJECTION, SOLUTION INTRAVENOUS at 15:51

## 2022-08-17 RX ADMIN — Medication 2 G: at 13:53

## 2022-08-17 RX ADMIN — FENTANYL CITRATE 25 MCG: 50 INJECTION, SOLUTION INTRAMUSCULAR; INTRAVENOUS at 17:48

## 2022-08-17 RX ADMIN — FENTANYL CITRATE 25 MCG: 50 INJECTION, SOLUTION INTRAMUSCULAR; INTRAVENOUS at 16:54

## 2022-08-17 RX ADMIN — SODIUM CHLORIDE: 900 IRRIGANT IRRIGATION at 18:47

## 2022-08-17 RX ADMIN — FUROSEMIDE 20 MG: 10 INJECTION, SOLUTION INTRAVENOUS at 15:45

## 2022-08-17 RX ADMIN — OXYCODONE HYDROCHLORIDE 10 MG: 5 TABLET ORAL at 18:57

## 2022-08-17 RX ADMIN — PROPOFOL 200 MG: 10 INJECTION, EMULSION INTRAVENOUS at 14:00

## 2022-08-17 RX ADMIN — SODIUM CHLORIDE, POTASSIUM CHLORIDE, SODIUM LACTATE AND CALCIUM CHLORIDE: 600; 310; 30; 20 INJECTION, SOLUTION INTRAVENOUS at 11:48

## 2022-08-17 RX ADMIN — ALFUZOSIN HYDROCHLORIDE 10 MG: 10 TABLET, EXTENDED RELEASE ORAL at 21:14

## 2022-08-17 RX ADMIN — METHYLENE BLUE 25 MG: 5 INJECTION INTRAVENOUS at 15:44

## 2022-08-17 RX ADMIN — DEXAMETHASONE SODIUM PHOSPHATE 4 MG: 4 INJECTION, SOLUTION INTRA-ARTICULAR; INTRALESIONAL; INTRAMUSCULAR; INTRAVENOUS; SOFT TISSUE at 14:06

## 2022-08-17 RX ADMIN — FENTANYL CITRATE 50 MCG: 50 INJECTION, SOLUTION INTRAMUSCULAR; INTRAVENOUS at 14:06

## 2022-08-17 RX ADMIN — INSULIN GLARGINE 30 UNITS: 100 INJECTION, SOLUTION SUBCUTANEOUS at 22:14

## 2022-08-17 RX ADMIN — LIDOCAINE HYDROCHLORIDE 100 MG: 20 INJECTION, SOLUTION INFILTRATION; PERINEURAL at 14:00

## 2022-08-17 RX ADMIN — ONDANSETRON 4 MG: 2 INJECTION INTRAMUSCULAR; INTRAVENOUS at 14:06

## 2022-08-17 ASSESSMENT — ENCOUNTER SYMPTOMS
DYSRHYTHMIAS: 0
SEIZURES: 0

## 2022-08-17 ASSESSMENT — ACTIVITIES OF DAILY LIVING (ADL)
ADLS_ACUITY_SCORE: 35
ADLS_ACUITY_SCORE: 35
ADLS_ACUITY_SCORE: 33
ADLS_ACUITY_SCORE: 37
ADLS_ACUITY_SCORE: 35
ADLS_ACUITY_SCORE: 35
ADLS_ACUITY_SCORE: 37

## 2022-08-17 NOTE — ANESTHESIA CARE TRANSFER NOTE
Patient: Monster Olmedo    Procedure: Procedure(s):  cystoscopy, greenlight laser photovaporization of the prostate       Diagnosis: BPH associated with nocturia [N40.1, R35.1]  Diagnosis Additional Information: No value filed.    Anesthesia Type:   General     Note:    Oropharynx: oropharynx clear of all foreign objects  Level of Consciousness: awake  Oxygen Supplementation: face mask  Level of Supplemental Oxygen (L/min / FiO2): 6  Independent Airway: airway patency satisfactory and stable  Dentition: dentition unchanged  Vital Signs Stable: post-procedure vital signs reviewed and stable  Report to RN Given: handoff report given  Patient transferred to: PACU    Handoff Report: Identifed the Patient, Identified the Reponsible Provider, Reviewed the pertinent medical history, Discussed the surgical course, Reviewed Intra-OP anesthesia mangement and issues during anesthesia, Set expectations for post-procedure period and Allowed opportunity for questions and acknowledgement of understanding      Vitals:  Vitals Value Taken Time   /72 08/17/22 1621   Temp     Pulse 68 08/17/22 1623   Resp 8 08/17/22 1623   SpO2 100 % 08/17/22 1623   Vitals shown include unvalidated device data.    Electronically Signed By: GEENA Townsend CRNA  August 17, 2022  4:24 PM

## 2022-08-17 NOTE — OP NOTE
[facility]  Operative Note    Pre-operative diagnosis: BPH associated with nocturia [N40.1, R35.1]   Post-operative diagnosis BPH associated with nocturia [N40.1, R35.1]   Procedure: Procedure(s):  cystoscopy, greenlight laser photovaporization of the prostate   Surgeon: Bebeto Cardoza MD   Assistants(s): none   Anesthesia: General    Estimated blood loss: Less than 20 ml    Total IV fluids: (See anesthesia record)   Blood transfusion: No transfusion was given during surgery   Total urine output: Not measured   Drains: 22 Fr 3-way Blackburn catheter   Specimens: * No specimens in log *     Implants: * No implants in log *       Findings:   Trilobar obstructive hypertrophy with very large median lobe  Greenlight XPS 80-180W, 337,068 joules, [id number] laser time  Unable to clearly identify left ureteral orifice at end of case.   Complications: None.   Condition: Stable             Description of procedure:  77 yo M with BPH with nocturia, enlarged 114 ml prostate on MRI with trilobar obstructive hypertrophy, who is here for cystoscopy with Greenlight laser photovaporization of the prostate. Risks including transient worsening of urinary symptoms, injury to the bladder or the urethral sphincter with the laser, infection, hematuria, retrograde ejaculation, urinary retention were discussed. Informed consent was obtained    Patient was brought to operating room #19.  Ancef antibiotics were given prior to the procedure.  General anesthesia was induced, he was placed in dorsal lithotomy position, prepped and draped in standard sterile fashion.  Timeout was performed.    A StorDubb continuous-flow laser scope was passed through the urethra into the bladder using the De Paz visual obturator.  The visual obturator was exchanged for the laser bridge.  Patient had a large obstructive prostate with trilobar hypertrophy and a significant intravesical median lobe.  I was able to visualize bilateral ureteral  orifices well away from the median lobe.  The Greenlight XPS Moxy fiber was brought onto the field.  Using the verumontanum as a distal landmark, photo vaporization of the prostate commenced.  I started by delineating the median lobe by creating furrows at 5:00 and 7:00.  These incisions were brought down towards the verumontanum.  The median lobe was then carefully taken down.  Laser power was varied between 80 W and 180 W.  Over 200,000 J of laser energy were used just in taking down the median lobe.  The ureteral orifices were checked several times during this part of the procedure and appeared to be intact.  The left lateral lobe followed by the right lateral lobe were then taken down.  The floor of the prostate was then smoothed out using the laser.  Hemostasis was achieved using coagulation settings.  A urovac evacuator was used to remove small bits of prostatic tissue and clot. At the end the procedure I again looked for confirmation of the ureteral orifices but was only able to identify the right ureteral orifice.  An extensive search was made for the left ureteral orifice but it could no longer be easily identified. The trigone had significant edema and oozing from passage of the scope over the urothelium while attempting to find the left UO.  Methylene blue was administered IV but after 20 minutes no blue efflux was seen. Given the identified location of the right ureteral orifice away from the median lobe, I do not believe that the left ureteral orifice had been cauterized using the laser, however we will keep the patient overnight on observation and ensure he does not develop flank pain. A 22 Fr 3-way Blackburn catheter was placed and 30 ml used to fill the balloon. He was placed on continuous saline bladder irrigation. A belladonna and opium suppository was placed per rectum. Patient was cleaned up, awoken from anesthesia and brought to PACU in stable condition.    Bebeto Cardoza MD   [facility]  Urology  [phone] [facility] phone

## 2022-08-17 NOTE — OR NURSING
Patient brought a picture of home covid antigen test on phone as directed.  I personally saw this result and it was time stamped 8/15/2022.  Result was negative.

## 2022-08-17 NOTE — ANESTHESIA PREPROCEDURE EVALUATION
Anesthesia Pre-Procedure Evaluation    Patient: Monster Olmedo   MRN: [id number] : 1945        Procedure : Procedure(s):  cystoscopy, greenlight laser photovaporization of the prostate          Past Medical History:   Diagnosis Date     Bilirubinemia      BPH with elevated PSA      Diabetes (H)      ED (erectile dysfunction)      Hip pain, right      Microalbuminuria      Paroxysmal atrial fibrillation (H) 2016     Peripheral neuropathy      Psoriasis      Sleep apnea     CPAP     Venous (peripheral) insufficiency       Past Surgical History:   Procedure Laterality Date     CHOLECYSTECTOMY       COLONOSCOPY       HAND SURGERY Left     finger     ORTHOPEDIC SURGERY      right partial meniscetomy     PROSTATE SURGERY        Allergies   Allergen Reactions     Fenofibrate      Other reaction(s): Flushing  tricor      Social History     Tobacco Use     Smoking status: Former Smoker     Smokeless tobacco: Never Used   Substance Use Topics     Alcohol use: Yes     Comment: occ      Wt Readings from Last 1 Encounters:   22 100.7 kg (222 lb)        Anesthesia Evaluation            ROS/MED HX  ENT/Pulmonary:     (+) sleep apnea, uses CPAP,     Neurologic:     (+) peripheral neuropathy,  (-) no seizures, no CVA and no TIA   Cardiovascular:    (-) CAD and arrhythmias   METS/Exercise Tolerance: >4 METS    Hematologic:       Musculoskeletal:       GI/Hepatic:    (-) liver disease   Renal/Genitourinary:     (+) BPH,  (-) renal disease   Endo:     (+) type II DM, Using insulin,     Psychiatric/Substance Use:       Infectious Disease:       Malignancy:       Other:            Physical Exam    Airway        Mallampati: II   TM distance: > 3 FB   Neck ROM: full   Mouth opening: > 3 cm    Respiratory Devices and Support         Dental  no notable dental history         Cardiovascular          Rhythm and rate: regular and normal     Pulmonary           breath sounds clear to auscultation           OUTSIDE  LABS:  CBC:   Lab Results   Component Value Date    WBC 7.7 06/11/2018    WBC 6.2 09/23/2016    HGB 15.1 06/11/2018    HGB 14.9 09/23/2016    HCT 43.1 06/11/2018    HCT 42.4 09/23/2016     06/11/2018     09/23/2016     BMP:   Lab Results   Component Value Date     06/11/2018     09/23/2016    POTASSIUM 3.7 06/11/2018    POTASSIUM 4.0 09/23/2016    CHLORIDE 103 06/11/2018    CHLORIDE 102 09/23/2016    CO2 32 06/11/2018    CO2 28 09/23/2016    BUN 18 06/11/2018    BUN 17 09/23/2016    CR 0.90 06/11/2018    CR 0.62 (L) 09/23/2016     (H) 06/11/2018     (H) 09/23/2016     COAGS: No results found for: PTT, INR, FIBR  POC: No results found for: BGM, HCG, HCGS  HEPATIC:   Lab Results   Component Value Date    ALBUMIN 4.0 06/11/2018    PROTTOTAL 7.7 06/11/2018    ALT 49 06/11/2018    AST 25 06/11/2018    ALKPHOS 73 06/11/2018    BILITOTAL 0.8 06/11/2018     OTHER:   Lab Results   Component Value Date    GEMMA 9.2 06/11/2018    LIPASE 199 06/11/2018    TSH 1.36 09/23/2016       Anesthesia Plan    ASA Status:  2      Anesthesia Type: General.     - Airway: LMA   Induction: Intravenous, Propofol.   Maintenance: Inhalation.        Consents    Anesthesia Plan(s) and associated risks, benefits, and realistic alternatives discussed. Questions answered and patient/representative(s) expressed understanding.     - Discussed: Risks, Benefits and Alternatives for BOTH SEDATION and the PROCEDURE were discussed     - Discussed with:  Patient, Spouse         Postoperative Care    Pain management: IV analgesics, Oral pain medications, Multi-modal analgesia.   PONV prophylaxis: Ondansetron (or other 5HT-3), Dexamethasone or Solumedrol     Comments:                Freya De Paz MD

## 2022-08-17 NOTE — OR NURSING
Report called to Primo GOMES on the 2nd floor. Pt transported per cart with all belongings and CPAP and CBI intact per CNA/tech to room 2211.

## 2022-08-18 VITALS
DIASTOLIC BLOOD PRESSURE: 61 MMHG | HEIGHT: 69 IN | RESPIRATION RATE: 16 BRPM | SYSTOLIC BLOOD PRESSURE: 109 MMHG | HEART RATE: 85 BPM | WEIGHT: 223.3 LBS | BODY MASS INDEX: 33.07 KG/M2 | TEMPERATURE: 97.6 F | OXYGEN SATURATION: 96 %

## 2022-08-18 LAB
ANION GAP SERPL CALCULATED.3IONS-SCNC: 7 MMOL/L (ref 3–14)
BUN SERPL-MCNC: 22 MG/DL (ref 7–30)
CALCIUM SERPL-MCNC: 8.8 MG/DL (ref 8.5–10.1)
CHLORIDE BLD-SCNC: 101 MMOL/L (ref 94–109)
CO2 SERPL-SCNC: 27 MMOL/L (ref 20–32)
CREAT SERPL-MCNC: 0.81 MG/DL (ref 0.66–1.25)
ERYTHROCYTE [DISTWIDTH] IN BLOOD BY AUTOMATED COUNT: 12.3 % (ref 10–15)
GFR SERPL CREATININE-BSD FRML MDRD: >90 ML/MIN/1.73M2
GLUCOSE BLD-MCNC: 218 MG/DL (ref 70–99)
GLUCOSE BLDC GLUCOMTR-MCNC: 187 MG/DL (ref 70–99)
GLUCOSE BLDC GLUCOMTR-MCNC: 209 MG/DL (ref 70–99)
GLUCOSE BLDC GLUCOMTR-MCNC: 218 MG/DL (ref 70–99)
HCT VFR BLD AUTO: 38.9 % (ref 40–53)
HGB BLD-MCNC: 13.4 G/DL (ref 13.3–17.7)
MCH RBC QN AUTO: 30 PG (ref 26.5–33)
MCHC RBC AUTO-ENTMCNC: 34.4 G/DL (ref 31.5–36.5)
MCV RBC AUTO: 87 FL (ref 78–100)
PLATELET # BLD AUTO: 223 10E3/UL (ref 150–450)
POTASSIUM BLD-SCNC: 3.7 MMOL/L (ref 3.4–5.3)
RBC # BLD AUTO: 4.46 10E6/UL (ref 4.4–5.9)
SODIUM SERPL-SCNC: 135 MMOL/L (ref 133–144)
WBC # BLD AUTO: 11.1 10E3/UL (ref 4–11)

## 2022-08-18 PROCEDURE — 85027 COMPLETE CBC AUTOMATED: CPT | Performed by: STUDENT IN AN ORGANIZED HEALTH CARE EDUCATION/TRAINING PROGRAM

## 2022-08-18 PROCEDURE — 36415 COLL VENOUS BLD VENIPUNCTURE: CPT | Performed by: STUDENT IN AN ORGANIZED HEALTH CARE EDUCATION/TRAINING PROGRAM

## 2022-08-18 PROCEDURE — 82962 GLUCOSE BLOOD TEST: CPT

## 2022-08-18 PROCEDURE — 250N000012 HC RX MED GY IP 250 OP 636 PS 637: Performed by: STUDENT IN AN ORGANIZED HEALTH CARE EDUCATION/TRAINING PROGRAM

## 2022-08-18 PROCEDURE — 82310 ASSAY OF CALCIUM: CPT | Performed by: STUDENT IN AN ORGANIZED HEALTH CARE EDUCATION/TRAINING PROGRAM

## 2022-08-18 PROCEDURE — 96372 THER/PROPH/DIAG INJ SC/IM: CPT | Performed by: STUDENT IN AN ORGANIZED HEALTH CARE EDUCATION/TRAINING PROGRAM

## 2022-08-18 RX ORDER — OXYCODONE HYDROCHLORIDE 5 MG/1
5 TABLET ORAL EVERY 6 HOURS PRN
Qty: 5 TABLET | Refills: 0 | Status: SHIPPED | OUTPATIENT
Start: 2022-08-18 | End: 2022-08-20

## 2022-08-18 RX ORDER — ACETAMINOPHEN 500 MG
1000 TABLET ORAL EVERY 6 HOURS PRN
Qty: 100 TABLET | Refills: 0 | Status: SHIPPED | OUTPATIENT
Start: 2022-08-18

## 2022-08-18 RX ORDER — DOCUSATE SODIUM 100 MG/1
100 CAPSULE, LIQUID FILLED ORAL 2 TIMES DAILY
Qty: 30 CAPSULE | Refills: 0 | Status: SHIPPED | OUTPATIENT
Start: 2022-08-18 | End: 2024-06-12

## 2022-08-18 RX ADMIN — INSULIN ASPART 2 UNITS: 100 INJECTION, SOLUTION INTRAVENOUS; SUBCUTANEOUS at 07:08

## 2022-08-18 ASSESSMENT — ACTIVITIES OF DAILY LIVING (ADL)
ADLS_ACUITY_SCORE: 37

## 2022-08-18 NOTE — PROGRESS NOTES
[facility] Urology Post-Op / Progress Note         Assessment and Plan:    Assessment:   Post-operative day #1  Cystoscopy, Greenlight laser PVP     Doing well, denies any flank pain. Normal creatinine      Plan:   Clamp cbi  If urine remains clear/clear pink, will plan to remove franco prior to discharge home today  Wean O2 prior to discharge  Follow up 1 month with BMP prior    Bebeto Cardoza MD   [facility] Urology  [phone] clinic phone             Interval History:   No acute events          Significant Problems:      Past Medical History:   Diagnosis Date     Bilirubinemia      BPH with elevated PSA      Diabetes (H)      ED (erectile dysfunction)      Hip pain, right      Microalbuminuria      Paroxysmal atrial fibrillation (H) 09/23/2016     Peripheral neuropathy      Psoriasis      Sleep apnea     CPAP     Venous (peripheral) insufficiency              Review of Systems:    The patient denies any chest pain, shortness of breath, excessive pain, fever, chills, purulent drainage from the wound, nausea or vomiting.          Medications:     All medications related to the patient's surgery have been reviewed  Current Facility-Administered Medications   Medication     acetaminophen (TYLENOL) tablet 650 mg     alfuzosin ER (UROXATRAL) 24 hr tablet 10 mg     glucose gel 15-30 g    Or     dextrose 50 % injection 25-50 mL    Or     glucagon injection 1 mg     insulin aspart (NovoLOG) injection (RAPID ACTING)     insulin aspart (NovoLOG) injection (RAPID ACTING)     insulin glargine (LANTUS PEN) injection 30 Units     lidocaine (LMX4) cream     lidocaine 1 % 0.1-1 mL     naloxone (NARCAN) injection 0.2 mg    Or     naloxone (NARCAN) injection 0.4 mg    Or     naloxone (NARCAN) injection 0.2 mg    Or     naloxone (NARCAN) injection 0.4 mg     oxyCODONE (ROXICODONE) tablet 5 mg    Or     oxyCODONE (ROXICODONE) tablet 10 mg     sodium chloride (PF) 0.9% PF flush 3 mL     sodium chloride (PF) 0.9% PF flush 3  mL     sodium chloride 0.9% (bag) irrigation             Physical Exam:   All vitals stable  Temp: 97.6  F (36.4  C) Temp src: Oral BP: 109/61 Pulse: 85   Resp: 16 SpO2: 96 % O2 Device: None (Room air) Oxygen Delivery: 8 LPM  Blackburn in place with clear pink urine on minimal gtt          Data:   All laboratory data related to this surgery reviewed    Component      Latest Ref Rng & Units 8/18/2022   Sodium      133 - 144 mmol/L 135   Potassium      3.4 - 5.3 mmol/L 3.7   Chloride      94 - 109 mmol/L 101   Carbon Dioxide      20 - 32 mmol/L 27   Anion Gap      3 - 14 mmol/L 7   Urea Nitrogen      7 - 30 mg/dL 22   Creatinine      0.66 - 1.25 mg/dL 0.81   Calcium      8.5 - 10.1 mg/dL 8.8   Glucose      70 - 99 mg/dL 218 (H)   GFR Estimate      >60 mL/min/1.73m2 >90   WBC      4.0 - 11.0 10e3/uL 11.1 (H)   RBC Count      4.40 - 5.90 10e6/uL 4.46   Hemoglobin      13.3 - 17.7 g/dL 13.4   Hematocrit      40.0 - 53.0 % 38.9 (L)   MCV      78 - 100 fL 87   MCH      26.5 - 33.0 pg 30.0   MCHC      31.5 - 36.5 g/dL 34.4   RDW      10.0 - 15.0 % 12.3   Platelet Count      150 - 450 10e3/uL 223     No imaging studies have been ordered    Bebeto Cardoza MD

## 2022-08-18 NOTE — DISCHARGE INSTRUCTIONS
POSTOPERATIVE INSTRUCTIONS    Diagnosis-------------------------------   BPH with LUTS    Procedure-------------------------------  Procedure(s) (LRB):  cystoscopy, greenlight laser photovaporization of the prostate (N/A)      Findings--------------------------------  Trilobar obstructive hypertrophy with very large median lobe  Greenlight XPS 80-180W, 337,068 joules, [id number] laser time  Unable to clearly identify left ureteral orifice at end of case.    Home-going instructions-----------------         Activity Limitation:     - no heavy lifting or strenuous activity for 2 weeks    FOLLOW THESE INSTRUCTIONS AS INDICATED BELOW:  - Observe operative area for signs of excessive bleeding.  - You may shower.  - Increase fluid intake to promote clear urine.  - Resume usual diet as tolerated    What to expect while recovering-----------  For the first few weeks after your procedure, you may notice that your urine is cloudy. Or you may have blood or blood clots in your urine. This is normal while your body rids itself of the treated tissue. These symptoms may begin to get better during the first few weeks. But it may take a few months before they go away. Your provider can tell you when you can have sex again and when you can go back to work.  You should drink plenty of fluids to help flush out your bladder.  You may experience some intermittent bleeding that makes your urine pink or cherry colored. This is normal.  However, if you are unable to urinate, passing large amount of clots, have gregoria blood in your urine, or have a temperature >101 degrees, call the urology nurse on call, or present to your nearest emergency department.      When to call your healthcare provider  Contact your healthcare provider right away if:  You have a fever of 100.4 F (38 C) or higher, or as directed by your provider  You have excessive bleeding  You have pain not relieved by medicines  You notice that no urine is draining from the  catheter or the catheter falls out  You have a frequent or very strong urge to urinate  You re not able to urinate, or notice a decrease in urine flow    Discharge Medications/instructions:   - Take Tylenol 1000mg every 6 hours for pain  - take oxycodone as needed in addition to the tylenol if you have pain uncontrolled by tylenol  - Take an over the counter stool softener to promote soft bowel movements  - stop finasteride  - continue alfuzosin for now        Questions/concerns------------------------  [facility]: [phone]    Future appointments  You will follow up with Dr. Bebeto Cardoza in  1 month with a blood test (BMP) prior      Bebeto Cardoza

## 2022-08-18 NOTE — PROGRESS NOTES
MD Notification    Notified Person: MD    Notified Person Name: Erik Holliday    Notification Date/Time: 0900    Notification Interaction: paged; phone     Purpose of Notification: Pt has DMII, no hospitalist on file and no insulin ordered. BS in 200s. Pt takes insulin at home. Can we get bedtime insulin or sliding scale ordered?     Orders Received: insulin orders recieved    Comments:

## 2022-08-18 NOTE — ANESTHESIA POSTPROCEDURE EVALUATION
Patient: Monster Olmedo    Procedure: Procedure(s):  cystoscopy, greenlight laser photovaporization of the prostate       Anesthesia Type:  General    Note:  Disposition: Inpatient   Postop Pain Control: Uneventful            Sign Out: Well controlled pain   PONV: No   Neuro/Psych: Uneventful            Sign Out: Acceptable/Baseline neuro status   Airway/Respiratory: Uneventful            Sign Out: Acceptable/Baseline resp. status   CV/Hemodynamics: Uneventful            Sign Out: Acceptable CV status   Other NRE: NONE   DID A NON-ROUTINE EVENT OCCUR? No           Last vitals:  Vitals Value Taken Time   /73 08/17/22 1800   Temp 35.7  C (96.2  F) 08/17/22 1621   Pulse 66 08/17/22 1813   Resp 16 08/17/22 1745   SpO2 98 % 08/17/22 1812   Vitals shown include unvalidated device data.    Electronically Signed By: Jamaal Guerra MD  August 17, 2022  7:56 PM

## 2022-08-18 NOTE — PLAN OF CARE
Goal Outcome Evaluation:    Plan of Care Reviewed With: patient, spouse     POD1 cystoscopy w/ greenlight reed photovaporization of the prostate. A&Ox4, VSS on w/ CPAP on overnight, tolerating regular diet. Ambulated in halls with A1/GB/W, tolerated well. Minimal pain no pain meds given overnight. Denies nausea. Blackburn/CBI patent & running at a slow rate w/ clear/light pink output. Some pain in R hip, pt scheduled for hip replacement in September. BS checks 266, 218, and 209. Encouraged use of IS. Will continue to monitor.

## 2022-08-18 NOTE — PROGRESS NOTES
Urology Update Note    Patient passed CBI clamp trial with appropriate appearing urine after 2 walks. I performed a fill and pull TOV at bedside with an acceptable PVR of 14 mL via bladder scan afterward. Urine light pink in color.    Encouraged drinking lots of water to keep urine light in color.  Ok for discharge home today.  Discharge order placed.    Discussed with staff, Dr. Cardoza.    Erik Holliday MD  Urology, PGY-5  (p) [phone]

## 2022-08-29 ENCOUNTER — NURSE TRIAGE (OUTPATIENT)
Dept: NURSING | Facility: CLINIC | Age: 77
End: 2022-08-29

## 2022-08-29 NOTE — TELEPHONE ENCOUNTER
Called  Patient instructed to call primary and ask if they can  Run a culture  if they cant he can come here , Reviewed that it maybe an infection or just irritation as the prostate is still healing . He was given Keflex  From primary . He states he is  voiding good amounts  But frequently . Reviewed dietary  irritants to avoid  He has been drinking coffee . He will paul back if they can not send a culture out

## 2022-08-29 NOTE — TELEPHONE ENCOUNTER
76 year old female s/p cystoscopy, greenlight laser photovaporization of the prostate on 8/17/22.  Monster calls today because he has been experiencing urgency,frequency,incontinence and burning with urination.Drinking good amount of fluids -urine is clear to yellow   Denies fever He was seen this morning for a pre-op appointment with Dr Cleveland at [facility]  He is scheduled for hip surgery on 9/6/22.  He had a UA/UC and the results came back with concerns for UTI.  I can not see the results in care everywhere but found the note from Dr. Cleveland-  5. Urinary symptom or sign R39.9 UA W/ SEDIMENT EXAM REFLEXED PER CRITERIA   URINE CULTURE   URINALYSIS MICROSCOPIC   cephalexin (KEFLEX) 500 mg capsule     Reviewed UA. He has signs of possible cystitis. Started antibiotic. It may be in his best interest to reschedule hip replacement due to higher risk of infection post operatively. He is also going to check with Surgeon.      Will forward to Dr Sykes team to review and follow-up with Monster                     Reason for Disposition    Patient wants to be seen    Additional Information    Negative: Chest pain    Negative: Difficulty breathing    Negative: Acting confused (e.g., disoriented, slurred speech) or excessively sleepy    Negative: Surgical incision symptoms and questions    Negative: Pain or burning with passing urine (urination) and male    Negative: Pain or burning with passing urine (urination) and female    Negative: Constipation    Negative: New or worsening leg (calf, thigh) pain    Negative: New or worsening leg swelling    Negative: Dizziness is severe, or persists > 24 hours after surgery    Negative: Symptoms arising from use of a urinary catheter (Blackburn or Coude)    Negative: Cast problems or questions    Negative: Medication question    Negative: Bright red, wide-spread, sunburn-like rash    Negative: SEVERE headache and after spinal (epidural) anesthesia    Negative: Vomiting and persists > 4  "hours    Negative: Vomiting and abdomen looks much more swollen than usual    Negative: Drinking very little and dehydration suspected (e.g., no urine > 12 hours, very dry mouth, very lightheaded)    Negative: Patient sounds very sick or weak to the triager    Negative: Sounds like a serious complication to the triager    Negative: Fever > 100.4 F (38.0 C)    Negative: Caller has URGENT question and triager unable to answer question    Negative: SEVERE post-op pain (e.g., excruciating, pain scale 8-10) that is not controlled with pain medications    Negative: Headache and after spinal (epidural) anesthesia and not severe    Negative: Fever present > 3 days (72 hours)    Answer Assessment - Initial Assessment Questions  1. SYMPTOM: \"What's the main symptom you're concerned about?\" (e.g., pain, fever, vomiting)      Urinary problems-frequency,burning with urination and incontinence  2. ONSET: \"When did symptoms  start?\"      On going since surgery  3. SURGERY: \"What surgery did you have?\"      cystoscopy, greenlight laser photovaporization of the prostate  4. DATE of SURGERY: \"When was the surgery?\"       8/17/22  5. ANESTHESIA: \" What type of anesthesia did you have?\" (e.g., general, spinal, epidural, local)      general  6. PAIN: \"Is there any pain?\" If Yes, ask: \"How bad is it?\"  (Scale 1-10; or mild, moderate, severe)      Yes with urinating  Mild-moderate  7. FEVER: \"Do you have a fever?\" If Yes, ask: \"What is your temperature, how was it measured, and when did it start?\"      no  8. VOMITING: \"Is there any vomiting?\" If Yes, ask: \"How many times?\"      no  9. BLEEDING: \"Is there any bleeding?\" If Yes, ask: \"How much?\" and \"Where?\"      no  10. OTHER SYMPTOMS: \"Do you have any other symptoms?\" (e.g., drainage from wound, painful urination, constipation)        As listed above    Protocols used: POST-OP SYMPTOMS AND [id number]    "

## 2022-09-21 ENCOUNTER — OFFICE VISIT (OUTPATIENT)
Dept: UROLOGY | Facility: CLINIC | Age: 77
End: 2022-09-21
Payer: COMMERCIAL

## 2022-09-21 ENCOUNTER — LAB (OUTPATIENT)
Dept: LAB | Facility: CLINIC | Age: 77
End: 2022-09-21
Payer: COMMERCIAL

## 2022-09-21 VITALS
WEIGHT: 219 LBS | DIASTOLIC BLOOD PRESSURE: 70 MMHG | OXYGEN SATURATION: 99 % | HEART RATE: 97 BPM | BODY MASS INDEX: 33.19 KG/M2 | HEIGHT: 68 IN | SYSTOLIC BLOOD PRESSURE: 137 MMHG

## 2022-09-21 DIAGNOSIS — R35.1 BENIGN PROSTATIC HYPERPLASIA WITH NOCTURIA: Primary | ICD-10-CM

## 2022-09-21 DIAGNOSIS — N40.1 BENIGN PROSTATIC HYPERPLASIA WITH NOCTURIA: ICD-10-CM

## 2022-09-21 DIAGNOSIS — N40.1 BENIGN PROSTATIC HYPERPLASIA WITH NOCTURIA: Primary | ICD-10-CM

## 2022-09-21 DIAGNOSIS — R35.1 BENIGN PROSTATIC HYPERPLASIA WITH NOCTURIA: ICD-10-CM

## 2022-09-21 DIAGNOSIS — N52.9 ERECTILE DYSFUNCTION, UNSPECIFIED ERECTILE DYSFUNCTION TYPE: ICD-10-CM

## 2022-09-21 LAB
ANION GAP SERPL CALCULATED.3IONS-SCNC: 7 MMOL/L (ref 3–14)
BUN SERPL-MCNC: 19 MG/DL (ref 7–30)
CALCIUM SERPL-MCNC: 9.1 MG/DL (ref 8.5–10.1)
CHLORIDE BLD-SCNC: 104 MMOL/L (ref 94–109)
CO2 SERPL-SCNC: 27 MMOL/L (ref 20–32)
CREAT SERPL-MCNC: 0.82 MG/DL (ref 0.66–1.25)
GFR SERPL CREATININE-BSD FRML MDRD: >90 ML/MIN/1.73M2
GLUCOSE BLD-MCNC: 154 MG/DL (ref 70–99)
POTASSIUM BLD-SCNC: 4.1 MMOL/L (ref 3.4–5.3)
RESIDUAL VOLUME (RV) (EXTERNAL): 60
SODIUM SERPL-SCNC: 138 MMOL/L (ref 133–144)

## 2022-09-21 PROCEDURE — 99214 OFFICE O/P EST MOD 30 MIN: CPT | Mod: 25 | Performed by: STUDENT IN AN ORGANIZED HEALTH CARE EDUCATION/TRAINING PROGRAM

## 2022-09-21 PROCEDURE — 80048 BASIC METABOLIC PNL TOTAL CA: CPT

## 2022-09-21 PROCEDURE — 51798 US URINE CAPACITY MEASURE: CPT | Performed by: STUDENT IN AN ORGANIZED HEALTH CARE EDUCATION/TRAINING PROGRAM

## 2022-09-21 PROCEDURE — 36415 COLL VENOUS BLD VENIPUNCTURE: CPT

## 2022-09-21 RX ORDER — TADALAFIL 5 MG/1
5 TABLET ORAL EVERY 24 HOURS
Qty: 90 TABLET | Refills: 3 | Status: SHIPPED | OUTPATIENT
Start: 2022-09-21 | End: 2024-06-12

## 2022-09-21 ASSESSMENT — PAIN SCALES - GENERAL: PAINLEVEL: MILD PAIN (2)

## 2022-09-21 NOTE — LETTER
"9/21/2022       RE: Monster Olmedo  [address]  [place] [address]     Dear Colleague,    Thank you for referring your patient, Monster Olmedo, to the [facility] at [facility]. Please see a copy of my visit note below.    CHIEF COMPLAINT   Monster Olmedo who is a 76 year old male returns today for follow-up of BPH with nocturia s/p Greenlight PVP 8/17/2022, erectile dysfunction    HPI   Monster Olmedo is a 76 year old male returns today for follow-up of BPH with nocturia s/p Greenlight PVP 8/17/2022, erectile dysfunction    He subsequently underwent hip surgery 9/6/2022    He did not have any problems with urinary retention.    A BMP on 9/9/2022 had normal SCr 0.90. BMP today is pending    AUA symptom score 3-3-1-5-1-0-3 = 16 moderate QOL mostly dissatisfied    He says he no longer has pain when urinating but still having urgency/frequency    He has seen an occupational therapist and is trying some exercises to retrain the bladder    He has stopped finasteride. He is still on alfuzosin.    He asks about erectile dysfunction    PHYSICAL EXAM  Patient is a 76 year old  male   Vitals: Blood pressure 137/70, pulse 97, height 1.727 m (5' 8\"), weight 99.3 kg (219 lb), SpO2 99 %.  Body mass index is 33.3 kg/m .  General Appearance Adult:   Alert, no acute distress, oriented  HENT: throat/mouth:normal, good dentition  Lungs: no respiratory distress, or pursed lip breathing  Heart: No obvious jugular venous distension present  Abdomen: nondistended  Musculoskeltal: extremities normal, no peripheral edema  Skin: no suspicious lesions or rashes  Neuro: Alert, oriented, speech and mentation normal  Psych: affect and mood normal  Gait: Normal    PVR 60 ml    ASSESSMENT and PLAN  76 year old male returns today for follow-up of BPH with nocturia s/p Greenlight PVP 8/17/2022.  Still feeling bothersome urinary symptoms but emptying well " PVR 60 ml and the AUA symptom score is now 16 down from 26 before. Expect continued improvement over the next few weeks.    We discussed erectile dysfunction. He wishes to try daily tadalafil. Risks and side effects discussed. Also discussed the potential for improvement of urination with daily tadalafil as well    - ok for sexual activity from a  standpoint  - start tadalafil 5 mg daily  - wean off alfuzosin, be careful when taking tadalafil and alfuzosin at same time  - return 3 months with AUA symptom score, PVR, ABNER score      Bebeto Cardoza MD   [facility] Urology  [facility] Phone: [phone]

## 2022-09-21 NOTE — PROGRESS NOTES
"CHIEF COMPLAINT   Monster Olmedo who is a 76 year old male returns today for follow-up of BPH with nocturia s/p Greenlight PVP 8/17/2022, erectile dysfunction    HPI   Monster Olmedo is a 76 year old male returns today for follow-up of BPH with nocturia s/p Greenlight PVP 8/17/2022, erectile dysfunction    He subsequently underwent hip surgery 9/6/2022    He did not have any problems with urinary retention.    A BMP on 9/9/2022 had normal SCr 0.90. BMP today is pending    AUA symptom score 3-3-1-5-1-0-3 = 16 moderate QOL mostly dissatisfied    He says he no longer has pain when urinating but still having urgency/frequency    He has seen an occupational therapist and is trying some exercises to retrain the bladder    He has stopped finasteride. He is still on alfuzosin.    He asks about erectile dysfunction    PHYSICAL EXAM  Patient is a 76 year old  male   Vitals: Blood pressure 137/70, pulse 97, height 1.727 m (5' 8\"), weight 99.3 kg (219 lb), SpO2 99 %.  Body mass index is 33.3 kg/m .  General Appearance Adult:   Alert, no acute distress, oriented  HENT: throat/mouth:normal, good dentition  Lungs: no respiratory distress, or pursed lip breathing  Heart: No obvious jugular venous distension present  Abdomen: nondistended  Musculoskeltal: extremities normal, no peripheral edema  Skin: no suspicious lesions or rashes  Neuro: Alert, oriented, speech and mentation normal  Psych: affect and mood normal  Gait: Normal    PVR 60 ml    ASSESSMENT and PLAN  76 year old male returns today for follow-up of BPH with nocturia s/p Greenlight PVP 8/17/2022.  Still feeling bothersome urinary symptoms but emptying well PVR 60 ml and the AUA symptom score is now 16 down from 26 before. Expect continued improvement over the next few weeks.    We discussed erectile dysfunction. He wishes to try daily tadalafil. Risks and side effects discussed. Also discussed the potential for improvement of urination with daily tadalafil as " well    - ok for sexual activity from a  standpoint  - start tadalafil 5 mg daily  - wean off alfuzosin, be careful when taking tadalafil and alfuzosin at same time  - return 3 months with AUA symptom score, PVR, ABNER score      Bebeto Cardoza MD   [facility] Urology  [facility] Phone: [phone]

## 2022-09-21 NOTE — NURSING NOTE
Chief Complaint   Patient presents with     Follow Up     Pt here today for 1 mth follow up with BMP, PVR, AUA score.      PVR scan was 60ml today    Freya Conteh

## 2022-10-22 ENCOUNTER — HEALTH MAINTENANCE LETTER (OUTPATIENT)
Age: 77
End: 2022-10-22

## 2023-01-02 ENCOUNTER — OFFICE VISIT (OUTPATIENT)
Dept: UROLOGY | Facility: CLINIC | Age: 78
End: 2023-01-02
Payer: COMMERCIAL

## 2023-01-02 VITALS
HEART RATE: 77 BPM | HEIGHT: 69 IN | BODY MASS INDEX: 33.33 KG/M2 | WEIGHT: 225 LBS | DIASTOLIC BLOOD PRESSURE: 78 MMHG | OXYGEN SATURATION: 99 % | SYSTOLIC BLOOD PRESSURE: 151 MMHG

## 2023-01-02 DIAGNOSIS — R35.1 BPH ASSOCIATED WITH NOCTURIA: Primary | ICD-10-CM

## 2023-01-02 DIAGNOSIS — N52.9 ERECTILE DYSFUNCTION, UNSPECIFIED ERECTILE DYSFUNCTION TYPE: ICD-10-CM

## 2023-01-02 DIAGNOSIS — N40.1 BPH ASSOCIATED WITH NOCTURIA: Primary | ICD-10-CM

## 2023-01-02 DIAGNOSIS — R39.15 URINARY URGENCY: ICD-10-CM

## 2023-01-02 LAB — RESIDUAL VOLUME (RV) (EXTERNAL): NORMAL

## 2023-01-02 PROCEDURE — 99214 OFFICE O/P EST MOD 30 MIN: CPT | Mod: 25 | Performed by: STUDENT IN AN ORGANIZED HEALTH CARE EDUCATION/TRAINING PROGRAM

## 2023-01-02 PROCEDURE — 51798 US URINE CAPACITY MEASURE: CPT | Performed by: STUDENT IN AN ORGANIZED HEALTH CARE EDUCATION/TRAINING PROGRAM

## 2023-01-02 RX ORDER — TADALAFIL 20 MG/1
20 TABLET ORAL DAILY PRN
Qty: 6 TABLET | Refills: 11 | Status: SHIPPED | OUTPATIENT
Start: 2023-01-02 | End: 2024-02-05

## 2023-01-02 RX ORDER — OXYBUTYNIN CHLORIDE 10 MG/1
10 TABLET, EXTENDED RELEASE ORAL DAILY
Qty: 45 TABLET | Refills: 0 | Status: SHIPPED | OUTPATIENT
Start: 2023-01-02 | End: 2023-02-15

## 2023-01-02 ASSESSMENT — PAIN SCALES - GENERAL: PAINLEVEL: MILD PAIN (2)

## 2023-01-02 NOTE — NURSING NOTE
Chief Complaint   Patient presents with     Benign Prostatic Hypertrophy     nocturtia   night -2,3 times getting up to urinate.  Good stream   Daytime more frequency everytime the pateint stands up feels the need to urinate .  Leakage too.  Ayesha Worley, CMA

## 2023-01-02 NOTE — LETTER
"1/2/2023       RE: Monster Olmedo  [address]  [place] [address]     Dear Colleague,    Thank you for referring your patient, Monster Olmedo, to the [facility] UROLOGY [facility]IN[facility] at [facility]. Please see a copy of my visit note below.    CHIEF COMPLAINT   Monster Olmedo who is a 77 year old male returns today for follow-up of BPH with nocturia s/p Greenlight PVP 8/17/2022, erectile dysfunction    HPI   Monster Olmedo is a 77 year old male returns today for follow-up of BPH with nocturia s/p Greenlight PVP 8/17/2022, erectile dysfunction    At last visit he was started on tadalafil 5 mg daily for erectile dysfunction. He has some stronger tumescence but has not attained penetration. He has tried sildenafil in the past and had side effects of headache    ABNER 1-0-1-0-0 =2 severe ED    He was told to wean off of alfuzosin for his BPH. He says his stream is quite strong but still having urinary frequency  AUA symptom score 4-3-2-5-1-0-3 = 18 QOL unhappy    PHYSICAL EXAM  Patient is a 77 year old  male   Vitals: Blood pressure (!) 151/78, pulse 77, height 1.753 m (5' 9\"), weight 102.1 kg (225 lb), SpO2 99 %.  Body mass index is 33.23 kg/m .  General Appearance Adult:   Alert, no acute distress, oriented  HENT: throat/mouth:normal, good dentition  Lungs: no respiratory distress, or pursed lip breathing  Heart: No obvious jugular venous distension present  Abdomen: nondistended  Musculoskeltal: extremities normal, no peripheral edema  Skin: no suspicious lesions or rashes  Neuro: Alert, oriented, speech and mentation normal  Psych: affect and mood normal  Gait: Normal  : deferred    PVR 8 ml    ASSESSMENT and PLAN  77 year old male returns today for follow-up of BPH with nocturia s/p Greenlight PVP 8/17/2022, erectile dysfunction    Try tadalafil 20 mg prn for ED since 5 mg daily not working    Re: urination he has strong stream but having some " issue with urgency, urge urinary incontinence. He is still on alfuzosin. Will trial anticholinergic oxybutinin @ 10 mg xl daily. If not working, he should contact us for stronger dose    He should also reduce his caffeine use (currently 2-3 cups coffee a day)      Bebeto aCrdoza MD   [facility] Urology  [facility] Phone: [phone]

## 2023-01-02 NOTE — PROGRESS NOTES
"CHIEF COMPLAINT   Monster Olmedo who is a 77 year old male returns today for follow-up of BPH with nocturia s/p Greenlight PVP 8/17/2022, erectile dysfunction    HPI   Monster Olmedo is a 77 year old male returns today for follow-up of BPH with nocturia s/p Greenlight PVP 8/17/2022, erectile dysfunction    At last visit he was started on tadalafil 5 mg daily for erectile dysfunction. He has some stronger tumescence but has not attained penetration. He has tried sildenafil in the past and had side effects of headache    ABNER 1-0-1-0-0 =2 severe ED    He was told to wean off of alfuzosin for his BPH. He says his stream is quite strong but still having urinary frequency  AUA symptom score 4-3-2-5-1-0-3 = 18 QOL unhappy    PHYSICAL EXAM  Patient is a 77 year old  male   Vitals: Blood pressure (!) 151/78, pulse 77, height 1.753 m (5' 9\"), weight 102.1 kg (225 lb), SpO2 99 %.  Body mass index is 33.23 kg/m .  General Appearance Adult:   Alert, no acute distress, oriented  HENT: throat/mouth:normal, good dentition  Lungs: no respiratory distress, or pursed lip breathing  Heart: No obvious jugular venous distension present  Abdomen: nondistended  Musculoskeltal: extremities normal, no peripheral edema  Skin: no suspicious lesions or rashes  Neuro: Alert, oriented, speech and mentation normal  Psych: affect and mood normal  Gait: Normal  : deferred    PVR 8 ml    ASSESSMENT and PLAN  77 year old male returns today for follow-up of BPH with nocturia s/p Greenlight PVP 8/17/2022, erectile dysfunction    Try tadalafil 20 mg prn for ED since 5 mg daily not working    Re: urination he has strong stream but having some issue with urgency, urge urinary incontinence. He is still on alfuzosin. Will trial anticholinergic oxybutinin @ 10 mg xl daily. If not working, he should contact us for stronger dose    He should also reduce his caffeine use (currently 2-3 cups coffee a day)      Bebeto Cardoza MD   [facility] Urology  " [facility] Phone: [phone]

## 2023-02-15 DIAGNOSIS — R39.15 URINARY URGENCY: ICD-10-CM

## 2023-02-15 RX ORDER — OXYBUTYNIN CHLORIDE 10 MG/1
10 TABLET, EXTENDED RELEASE ORAL DAILY
Qty: 90 TABLET | Refills: 3 | Status: SHIPPED | OUTPATIENT
Start: 2023-02-15 | End: 2024-01-30

## 2023-03-13 ENCOUNTER — OFFICE VISIT (OUTPATIENT)
Dept: UROLOGY | Facility: CLINIC | Age: 78
End: 2023-03-13
Payer: COMMERCIAL

## 2023-03-13 VITALS
BODY MASS INDEX: 32.73 KG/M2 | HEIGHT: 69 IN | WEIGHT: 221 LBS | OXYGEN SATURATION: 97 % | DIASTOLIC BLOOD PRESSURE: 73 MMHG | SYSTOLIC BLOOD PRESSURE: 148 MMHG | HEART RATE: 78 BPM

## 2023-03-13 DIAGNOSIS — R80.9 PROTEINURIA, UNSPECIFIED TYPE: ICD-10-CM

## 2023-03-13 DIAGNOSIS — N40.1 BPH ASSOCIATED WITH NOCTURIA: Primary | ICD-10-CM

## 2023-03-13 DIAGNOSIS — N52.9 ERECTILE DYSFUNCTION, UNSPECIFIED ERECTILE DYSFUNCTION TYPE: ICD-10-CM

## 2023-03-13 DIAGNOSIS — R35.1 BPH ASSOCIATED WITH NOCTURIA: Primary | ICD-10-CM

## 2023-03-13 LAB
ALBUMIN UR-MCNC: ABNORMAL MG/DL
APPEARANCE UR: CLEAR
BILIRUB UR QL STRIP: NEGATIVE
COLOR UR AUTO: YELLOW
GLUCOSE UR STRIP-MCNC: NEGATIVE MG/DL
HGB UR QL STRIP: NEGATIVE
KETONES UR STRIP-MCNC: NEGATIVE MG/DL
LEUKOCYTE ESTERASE UR QL STRIP: NEGATIVE
NITRATE UR QL: NEGATIVE
PH UR STRIP: 5.5 [PH] (ref 5–7)
RESIDUAL VOLUME (RV) (EXTERNAL): 14
SP GR UR STRIP: 1.02 (ref 1–1.03)
UROBILINOGEN UR STRIP-ACNC: 0.2 E.U./DL

## 2023-03-13 PROCEDURE — 81003 URINALYSIS AUTO W/O SCOPE: CPT | Mod: QW | Performed by: STUDENT IN AN ORGANIZED HEALTH CARE EDUCATION/TRAINING PROGRAM

## 2023-03-13 PROCEDURE — 51798 US URINE CAPACITY MEASURE: CPT | Performed by: STUDENT IN AN ORGANIZED HEALTH CARE EDUCATION/TRAINING PROGRAM

## 2023-03-13 PROCEDURE — 99214 OFFICE O/P EST MOD 30 MIN: CPT | Performed by: STUDENT IN AN ORGANIZED HEALTH CARE EDUCATION/TRAINING PROGRAM

## 2023-03-13 ASSESSMENT — PAIN SCALES - GENERAL: PAINLEVEL: MILD PAIN (2)

## 2023-03-13 NOTE — LETTER
"3/13/2023       RE: Monster Olmedo  [address]  [place] [address]     Dear Colleague,    Thank you for referring your patient, Monster Olmedo, to the [facility] at [facility]. Please see a copy of my visit note below.    CHIEF COMPLAINT   Monster Olmedo who is a 77 year old male returns today for follow-up of BPH with nocturia s/p Greenlight PVP 8/17/2022, ED.      HPI   Monster Olmedo is a 77 year old male returns today for follow-up of BPH with nocturia s/p Greenlight PVP 8/17/2022, ED    At last visit was started on oxybutinin for urgency, and was told to reduce caffeine use    AUA symptom score 2-3-2-4-1-0-3 = 14 moderate QOL mixed    His urinary symptoms are much improved from prior, he is still wearing a pad daily and has a small amount of leakage daily (mostly insensate incontinence)    Re: ED he was trialed on 20 mg tadalafil prn instead of 5 mg daily, which did not seem to help. He has heard TV ads for shockwave for ED.    PHYSICAL EXAM  Patient is a 77 year old  male   Vitals: Blood pressure (!) 148/73, pulse 78, height 1.753 m (5' 9\"), weight 100.2 kg (221 lb), SpO2 97 %.  Body mass index is 32.64 kg/m .  General Appearance Adult:   Alert, no acute distress, oriented  HENT: throat/mouth:normal, good dentition  Lungs: no respiratory distress, or pursed lip breathing  Heart: No obvious jugular venous distension present  Abdomen: nondistended  Musculoskeltal: extremities normal, no peripheral edema  Skin: no suspicious lesions or rashes  Neuro: Alert, oriented, speech and mentation normal  Psych: affect and mood normal  Gait: Normal  : deferred    PVR 14 ml     Latest Reference Range & Units 03/13/23 13:13   Color Urine Colorless, Straw, Light Yellow, Yellow  Yellow   Appearance Urine Clear  Clear   Glucose Urine Negative mg/dL Negative   Bilirubin Urine Negative  Negative   Ketones Urine Negative mg/dL Negative "   Specific Gravity Urine 1.003 - 1.035  1.025   pH Urine 5.0 - 7.0  5.5   Protein Albumin Urine Negative mg/dL Trace !   Urobilinogen Urine 0.2, 1.0 E.U./dL 0.2   Nitrite Urine Negative  Negative   Blood Urine Negative  Negative   Leukocyte Esterase Urine Negative  Negative   !: Data is abnormal    ASSESSMENT and PLAN  77 year old male returns today for follow-up of BPH with nocturia s/p Greenlight PVP 8/17/2022, ED    Re: ED, tadalafil 20 mg is not being that effective, can only sometimes have penetrative intercourse. I offered intracavernosal injection but he is not interested. He has tried GRAYSON in the past which didn't work either. I briefly discussed inflatable penile prosthesis but he wants to think about it. He has tried sildenafil, tadalafil, and vardenafil in the past, offered avanafil but he declines. He just wants to stick with 20 mg tadalafil prn for now    Re: urination, improved symptoms on oxybutinin. He is emptying bladder to completion. He is having some insensate incontinence that is being managed with a pad. Continue oxybutinin.     Proteinuria - trace, has previously had this in 2018. Probably related to DM. Will continue to monitor with annual UA    - continue tadalafil  - continue oxybutinin 10 mg XL daily  - return 1 year with UA, PVR, AUA symptom score, ABNER score or earlier if symptoms worsen or if he wants to discuss IPP        Bebeto Cardoza MD   [facility] Urology  [facility] Phone: [phone]

## 2023-03-13 NOTE — PROGRESS NOTES
"CHIEF COMPLAINT   Monster Olmedo who is a 77 year old male returns today for follow-up of BPH with nocturia s/p Greenlight PVP 8/17/2022, ED.      HPI   Monster Olmedo is a 77 year old male returns today for follow-up of BPH with nocturia s/p Greenlight PVP 8/17/2022, ED    At last visit was started on oxybutinin for urgency, and was told to reduce caffeine use    AUA symptom score 2-3-2-4-1-0-3 = 14 moderate QOL mixed    His urinary symptoms are much improved from prior, he is still wearing a pad daily and has a small amount of leakage daily (mostly insensate incontinence)    Re: ED he was trialed on 20 mg tadalafil prn instead of 5 mg daily, which did not seem to help. He has heard TV ads for shockwave for ED.    PHYSICAL EXAM  Patient is a 77 year old  male   Vitals: Blood pressure (!) 148/73, pulse 78, height 1.753 m (5' 9\"), weight 100.2 kg (221 lb), SpO2 97 %.  Body mass index is 32.64 kg/m .  General Appearance Adult:   Alert, no acute distress, oriented  HENT: throat/mouth:normal, good dentition  Lungs: no respiratory distress, or pursed lip breathing  Heart: No obvious jugular venous distension present  Abdomen: nondistended  Musculoskeltal: extremities normal, no peripheral edema  Skin: no suspicious lesions or rashes  Neuro: Alert, oriented, speech and mentation normal  Psych: affect and mood normal  Gait: Normal  : deferred    PVR 14 ml     Latest Reference Range & Units 03/13/23 13:13   Color Urine Colorless, Straw, Light Yellow, Yellow  Yellow   Appearance Urine Clear  Clear   Glucose Urine Negative mg/dL Negative   Bilirubin Urine Negative  Negative   Ketones Urine Negative mg/dL Negative   Specific Gravity Urine 1.003 - 1.035  1.025   pH Urine 5.0 - 7.0  5.5   Protein Albumin Urine Negative mg/dL Trace !   Urobilinogen Urine 0.2, 1.0 E.U./dL 0.2   Nitrite Urine Negative  Negative   Blood Urine Negative  Negative   Leukocyte Esterase Urine Negative  Negative   !: Data is abnormal    ASSESSMENT " and PLAN  77 year old male returns today for follow-up of BPH with nocturia s/p Greenlight PVP 8/17/2022, ED    Re: ED, tadalafil 20 mg is not being that effective, can only sometimes have penetrative intercourse. I offered intracavernosal injection but he is not interested. He has tried GRAYSON in the past which didn't work either. I briefly discussed inflatable penile prosthesis but he wants to think about it. He has tried sildenafil, tadalafil, and vardenafil in the past, offered avanafil but he declines. He just wants to stick with 20 mg tadalafil prn for now    Re: urination, improved symptoms on oxybutinin. He is emptying bladder to completion. He is having some insensate incontinence that is being managed with a pad. Continue oxybutinin.     Proteinuria - trace, has previously had this in 2018. Probably related to DM. Will continue to monitor with annual UA    - continue tadalafil  - continue oxybutinin 10 mg XL daily  - return 1 year with UA, PVR, AUA symptom score, ABNER score or earlier if symptoms worsen or if he wants to discuss IPP        Bebeto Cardoza MD   [facility]rology  [facility] Phone: [phone]

## 2023-03-13 NOTE — NURSING NOTE
Chief Complaint   Patient presents with     Benign Prostatic Hypertrophy     Nocturia   twice a night to urinate   Better with leakage   Good stream too   Daytime have urgency to urinate   PVR is 14ml   Ayesha Worley, clinic assistant

## 2023-03-20 ENCOUNTER — APPOINTMENT (OUTPATIENT)
Dept: URBAN - METROPOLITAN AREA CLINIC 256 | Age: 78
Setting detail: DERMATOLOGY
End: 2023-03-20

## 2023-03-20 VITALS — HEIGHT: 69 IN | WEIGHT: 220 LBS

## 2023-03-20 DIAGNOSIS — D18.0 HEMANGIOMA: ICD-10-CM

## 2023-03-20 DIAGNOSIS — L40.0 PSORIASIS VULGARIS: ICD-10-CM

## 2023-03-20 DIAGNOSIS — D22 MELANOCYTIC NEVI: ICD-10-CM

## 2023-03-20 DIAGNOSIS — L82.1 OTHER SEBORRHEIC KERATOSIS: ICD-10-CM

## 2023-03-20 DIAGNOSIS — Z71.89 OTHER SPECIFIED COUNSELING: ICD-10-CM

## 2023-03-20 DIAGNOSIS — L57.8 OTHER SKIN CHANGES DUE TO CHRONIC EXPOSURE TO NONIONIZING RADIATION: ICD-10-CM

## 2023-03-20 PROBLEM — D18.01 HEMANGIOMA OF SKIN AND SUBCUTANEOUS TISSUE: Status: ACTIVE | Noted: 2023-03-20

## 2023-03-20 PROBLEM — D22.5 MELANOCYTIC NEVI OF TRUNK: Status: ACTIVE | Noted: 2023-03-20

## 2023-03-20 PROBLEM — D23.72 OTHER BENIGN NEOPLASM OF SKIN OF LEFT LOWER LIMB, INCLUDING HIP: Status: ACTIVE | Noted: 2023-03-20

## 2023-03-20 PROCEDURE — OTHER ADDITIONAL NOTES: OTHER

## 2023-03-20 PROCEDURE — 99213 OFFICE O/P EST LOW 20 MIN: CPT

## 2023-03-20 PROCEDURE — OTHER MIPS QUALITY: OTHER

## 2023-03-20 PROCEDURE — OTHER COUNSELING: OTHER

## 2023-03-20 ASSESSMENT — LOCATION DETAILED DESCRIPTION DERM
LOCATION DETAILED: RIGHT INFERIOR UPPER BACK
LOCATION DETAILED: RIGHT MID-UPPER BACK
LOCATION DETAILED: STERNUM

## 2023-03-20 ASSESSMENT — LOCATION SIMPLE DESCRIPTION DERM
LOCATION SIMPLE: RIGHT UPPER BACK
LOCATION SIMPLE: CHEST

## 2023-03-20 ASSESSMENT — LOCATION ZONE DERM: LOCATION ZONE: TRUNK

## 2023-03-20 NOTE — PROCEDURE: ADDITIONAL NOTES
Detail Level: Simple
Render Risk Assessment In Note?: no
Additional Notes: -Advised patient to wear physical protecting with long sun exposure (swim shirt and golf shirts).

## 2023-03-20 NOTE — PROCEDURE: MIPS QUALITY
Quality 111:Pneumonia Vaccination Status For Older Adults: Pneumococcal vaccine (PPSV23) administered on or after patient’s 60th birthday and before the end of the measurement period
Detail Level: Detailed
Quality 226: Preventive Care And Screening: Tobacco Use: Screening And Cessation Intervention: Patient screened for tobacco use and is an ex/non-smoker
Quality 110: Preventive Care And Screening: Influenza Immunization: Influenza Immunization Administered during Influenza season
Quality 47: Advance Care Plan: Advance Care Planning discussed and documented in the medical record; patient did not wish or was not able to name a surrogate decision maker or provide an advance care plan.
Quality 431: Preventive Care And Screening: Unhealthy Alcohol Use - Screening: Patient not identified as an unhealthy alcohol user when screened for unhealthy alcohol use using a systematic screening method

## 2023-11-05 ENCOUNTER — HEALTH MAINTENANCE LETTER (OUTPATIENT)
Age: 78
End: 2023-11-05

## 2024-01-30 DIAGNOSIS — R39.15 URINARY URGENCY: ICD-10-CM

## 2024-01-30 RX ORDER — OXYBUTYNIN CHLORIDE 10 MG/1
10 TABLET, EXTENDED RELEASE ORAL DAILY
Qty: 90 TABLET | Refills: 1 | Status: SHIPPED | OUTPATIENT
Start: 2024-01-30 | End: 2024-07-15

## 2024-02-05 DIAGNOSIS — N52.9 ERECTILE DYSFUNCTION, UNSPECIFIED ERECTILE DYSFUNCTION TYPE: ICD-10-CM

## 2024-02-06 RX ORDER — TADALAFIL 20 MG/1
20 TABLET ORAL DAILY PRN
Qty: 6 TABLET | Refills: 1 | Status: SHIPPED | OUTPATIENT
Start: 2024-02-06 | End: 2024-06-12

## 2024-06-12 ENCOUNTER — OFFICE VISIT (OUTPATIENT)
Dept: UROLOGY | Facility: CLINIC | Age: 79
End: 2024-06-12
Payer: COMMERCIAL

## 2024-06-12 VITALS
WEIGHT: 213 LBS | BODY MASS INDEX: 32.28 KG/M2 | HEIGHT: 68 IN | OXYGEN SATURATION: 98 % | HEART RATE: 82 BPM | SYSTOLIC BLOOD PRESSURE: 125 MMHG | DIASTOLIC BLOOD PRESSURE: 67 MMHG

## 2024-06-12 DIAGNOSIS — R32 URINARY INCONTINENCE, UNSPECIFIED TYPE: ICD-10-CM

## 2024-06-12 DIAGNOSIS — N40.1 BPH ASSOCIATED WITH NOCTURIA: Primary | ICD-10-CM

## 2024-06-12 DIAGNOSIS — F40.240 CLAUSTROPHOBIA: ICD-10-CM

## 2024-06-12 DIAGNOSIS — R35.1 BPH ASSOCIATED WITH NOCTURIA: Primary | ICD-10-CM

## 2024-06-12 DIAGNOSIS — N40.2 PROSTATE NODULE: ICD-10-CM

## 2024-06-12 DIAGNOSIS — N52.9 ERECTILE DYSFUNCTION, UNSPECIFIED ERECTILE DYSFUNCTION TYPE: ICD-10-CM

## 2024-06-12 DIAGNOSIS — R97.20 RISING PSA LEVEL: ICD-10-CM

## 2024-06-12 LAB
ALBUMIN UR-MCNC: ABNORMAL MG/DL
APPEARANCE UR: CLEAR
BILIRUB UR QL STRIP: NEGATIVE
COLOR UR AUTO: YELLOW
GLUCOSE UR STRIP-MCNC: NEGATIVE MG/DL
HGB UR QL STRIP: NEGATIVE
KETONES UR STRIP-MCNC: ABNORMAL MG/DL
LEUKOCYTE ESTERASE UR QL STRIP: NEGATIVE
NITRATE UR QL: NEGATIVE
PH UR STRIP: 5.5 [PH] (ref 5–7)
SP GR UR STRIP: 1.02 (ref 1–1.03)
UROBILINOGEN UR STRIP-ACNC: 0.2 E.U./DL

## 2024-06-12 PROCEDURE — 81003 URINALYSIS AUTO W/O SCOPE: CPT | Mod: QW | Performed by: STUDENT IN AN ORGANIZED HEALTH CARE EDUCATION/TRAINING PROGRAM

## 2024-06-12 PROCEDURE — 51798 US URINE CAPACITY MEASURE: CPT | Performed by: STUDENT IN AN ORGANIZED HEALTH CARE EDUCATION/TRAINING PROGRAM

## 2024-06-12 PROCEDURE — 99214 OFFICE O/P EST MOD 30 MIN: CPT | Mod: 25 | Performed by: STUDENT IN AN ORGANIZED HEALTH CARE EDUCATION/TRAINING PROGRAM

## 2024-06-12 RX ORDER — TADALAFIL 5 MG/1
5 TABLET ORAL EVERY 24 HOURS
Qty: 90 TABLET | Refills: 3 | Status: SHIPPED | OUTPATIENT
Start: 2024-06-12

## 2024-06-12 RX ORDER — DIAZEPAM 5 MG
5 TABLET ORAL ONCE
Qty: 1 TABLET | Refills: 0 | Status: SHIPPED | OUTPATIENT
Start: 2024-06-12 | End: 2024-06-12

## 2024-06-12 ASSESSMENT — PAIN SCALES - GENERAL: PAINLEVEL: MODERATE PAIN (5)

## 2024-06-12 NOTE — PROGRESS NOTES
"CHIEF COMPLAINT   Monster Olmedo who is a 78 year old male returns today for follow-up of BPH with nocturia s/p Greenlight PVP 8/17/2022, ED .      HPI   Monster Olmedo is a 78 year old male returns today for follow-up of BPH with nocturia s/p Greenlight PVP 8/17/2022, ED    AUA symptom score 4-3-3-4-3-2-2 = 21 qol mixed. At last visit we had continued him on oxybutinin 10 mg xl daily. Urinary symptoms are stable, he wears pads for urinary incontinence both with urge and without sensory awareness. Uses 1 pad during the day and then 1 at night for protection. Apparently he is still taking alfuzosin somehow (I haven't refilled this in years as far as I can tell). He is \"addicted\" to coffee    ABNER 0 (no sexual activity/did not attempt). The tadalafil 20 mg prn doesn't seem to do very much. He wants to go back to daily tadalafil 5 mg again        PHYSICAL EXAM  Patient is a 78 year old  male   Vitals: Blood pressure 125/67, pulse 82, height 1.727 m (5' 8\"), weight 96.6 kg (213 lb), SpO2 98%.  Body mass index is 32.39 kg/m .  General Appearance Adult:   Alert, no acute distress, oriented  HENT: throat/mouth:normal, good dentition  Lungs: no respiratory distress, or pursed lip breathing  Heart: No obvious jugular venous distension present  Abdomen: nondistended  Musculoskeltal: extremities normal, no peripheral edema  Skin: no suspicious lesions or rashes  Neuro: Alert, oriented, speech and mentation normal  Psych: affect and mood normal  Gait: Normal  : enlarged prostate with left mid nodule    PVR 41 ml    Component      Latest Ref Rng 6/12/2024  8:01 AM   Color Urine      Colorless, Straw, Light Yellow, Yellow  Yellow    Appearance Urine      Clear  Clear    Glucose Urine      Negative mg/dL Negative    Bilirubin Urine      Negative  Negative    Ketones Urine      Negative mg/dL Trace !    Specific Gravity Urine      1.003 - 1.035  1.020    Blood Urine      Negative  Negative    pH Urine      5.0 - 7.0  5.5  " "  Protein Albumin Urine      Negative mg/dL Trace !    Urobilinogen Urine      0.2, 1.0 E.U./dL 0.2    Nitrite Urine      Negative  Negative    Leukocyte Esterase Urine      Negative  Negative       Legend:  ! Abnormal    PSA TOTAL (DIAGNOSTIC)  Order: [id number]   suggestion  Information displayed in this report may not trend or trigger automated decision support.     Component  Ref Range & Units 12 d ago   PSA TOTAL (DIAGNOSTIC)  <4.00 ng/mL 8.55 High    Narrative    The test method changed on 6/27/2023. If this test has been used for serial monitoring, rebaselining is recommended. Rebaselining consists of 2 measurements, collected 3-6 weeks apart.  The Roche Elecsys total PSA assay is an electrochemiluminescence immunoassay \"ECLIA\" performed on the Roche Ag \"e\" immunoassay analyzers.  Values obtained with different assay methods may be different and cannot be used interchangeably.    Specimen Collected: 05/31/24  9:15 AM    Performed by: [facility] Last Resulted: 05/31/24  2:30 PM   [facility]  Result Received: 06/12/24  7:34 AM       ASSESSMENT and PLAN  78 year old male returns today for follow-up of BPH with nocturia s/p Greenlight PVP 8/17/2022, ED, urinary incontinence, rising PSA    Re: rising PSA, PSA is now 8.55 ng/ml from 6.2 in 2022. Also has a left mid prostate nodule. Recommend prostate MRI followed by likely prostate biopsy depending on results (if no target on MRI, he has a palpable nodule which deserves random biopsy). Note that he has a right hip arthroplasty which may cause artifact on the MRI. He has claustrophobia and requests valium        Try stopping alfuzosin and monitoring urinary symptoms, should not need it after Greenlight PVP (emptying well with low PVR today)  Continue oxybutinin 10 mg daily for urinary incontinence (predominant urge). He needs to stop drinking coffee and other bladder irritants  Re: " ED: Stop tadalafil 20 mg prn; start tadalafil 5 mg daily (rx drug management)          Bebeto Cardoza MD   [facility] Urology  [facility] Phone: [phone]

## 2024-06-12 NOTE — NURSING NOTE
Chief Complaint   Patient presents with    Benign Prostatic Hypertrophy     nocturia    Pvr is 41ml done with bladder scan   Ayesha Worley, CMA

## 2024-06-12 NOTE — LETTER
"6/12/2024       RE: Monster Olmedo  [address]  [place] [address]     Dear Colleague,    Thank you for referring your patient, Monster Olmedo, to the [facility]OLOGY [facility] at [facility]. Please see a copy of my visit note below.    CHIEF COMPLAINT   Monster Olmedo who is a 78 year old male returns today for follow-up of BPH with nocturia s/p Greenlight PVP 8/17/2022, ED .      HPI   Monster Olmedo is a 78 year old male returns today for follow-up of BPH with nocturia s/p Greenlight PVP 8/17/2022, ED    AUA symptom score 4-3-3-4-3-2-2 = 21 qol mixed. At last visit we had continued him on oxybutinin 10 mg xl daily. Urinary symptoms are stable, he wears pads for urinary incontinence both with urge and without sensory awareness. Uses 1 pad during the day and then 1 at night for protection. Apparently he is still taking alfuzosin somehow (I haven't refilled this in years as far as I can tell). He is \"addicted\" to coffee    ABNER 0 (no sexual activity/did not attempt). The tadalafil 20 mg prn doesn't seem to do very much. He wants to go back to daily tadalafil 5 mg again        PHYSICAL EXAM  Patient is a 78 year old  male   Vitals: Blood pressure 125/67, pulse 82, height 1.727 m (5' 8\"), weight 96.6 kg (213 lb), SpO2 98%.  Body mass index is 32.39 kg/m .  General Appearance Adult:   Alert, no acute distress, oriented  HENT: throat/mouth:normal, good dentition  Lungs: no respiratory distress, or pursed lip breathing  Heart: No obvious jugular venous distension present  Abdomen: nondistended  Musculoskeltal: extremities normal, no peripheral edema  Skin: no suspicious lesions or rashes  Neuro: Alert, oriented, speech and mentation normal  Psych: affect and mood normal  Gait: Normal  : enlarged prostate with left mid nodule    PVR 41 ml    Component      Latest Ref Rng 6/12/2024  8:01 AM   Color Urine      Colorless, Straw, Light Yellow, " "Yellow  Yellow    Appearance Urine      Clear  Clear    Glucose Urine      Negative mg/dL Negative    Bilirubin Urine      Negative  Negative    Ketones Urine      Negative mg/dL Trace !    Specific Gravity Urine      1.003 - 1.035  1.020    Blood Urine      Negative  Negative    pH Urine      5.0 - 7.0  5.5    Protein Albumin Urine      Negative mg/dL Trace !    Urobilinogen Urine      0.2, 1.0 E.U./dL 0.2    Nitrite Urine      Negative  Negative    Leukocyte Esterase Urine      Negative  Negative       Legend:  ! Abnormal    PSA TOTAL (DIAGNOSTIC)  Order: [id number]   suggestion  Information displayed in this report may not trend or trigger automated decision support.     Component  Ref Range & Units 12 d ago   PSA TOTAL (DIAGNOSTIC)  <4.00 ng/mL 8.55 High    Narrative    The test method changed on 6/27/2023. If this test has been used for serial monitoring, rebaselining is recommended. Rebaselining consists of 2 measurements, collected 3-6 weeks apart.  The Roche Elecsys total PSA assay is an electrochemiluminescence immunoassay \"ECLIA\" performed on the Roche Ag \"e\" immunoassay analyzers.  Values obtained with different assay methods may be different and cannot be used interchangeably.    Specimen Collected: 05/31/24  9:15 AM    Performed by: [facility] Last Resulted: 05/31/24  2:30 PM   Received From: [facility]  Result Received: 06/12/24  7:34 AM       ASSESSMENT and PLAN  78 year old male returns today for follow-up of BPH with nocturia s/p Greenlight PVP 8/17/2022, ED, urinary incontinence, rising PSA    Re: rising PSA, PSA is now 8.55 ng/ml from 6.2 in 2022. Also has a left mid prostate nodule. Recommend prostate MRI followed by likely prostate biopsy depending on results (if no target on MRI, he has a palpable nodule which deserves random biopsy)    Try stopping alfuzosin and monitoring urinary symptoms, should not need it after Greenlight " PVP (emptying well with low PVR today)  Continue oxybutinin 10 mg daily for urinary incontinence (predominant urge). He needs to stop drinking coffee and other bladder irritants  Re: ED: Stop tadalafil 20 mg prn; start tadalafil 5 mg daily (rx drug management)          Bebeto Cardoza MD   [facility] Urology  [facility] Phone: [phone]

## 2024-07-15 DIAGNOSIS — R39.15 URINARY URGENCY: ICD-10-CM

## 2024-07-16 RX ORDER — OXYBUTYNIN CHLORIDE 10 MG/1
10 TABLET, EXTENDED RELEASE ORAL DAILY
Qty: 90 TABLET | Refills: 3 | Status: SHIPPED | OUTPATIENT
Start: 2024-07-16

## 2024-07-17 ENCOUNTER — ANCILLARY PROCEDURE (OUTPATIENT)
Dept: MRI IMAGING | Facility: CLINIC | Age: 79
End: 2024-07-17
Attending: STUDENT IN AN ORGANIZED HEALTH CARE EDUCATION/TRAINING PROGRAM
Payer: COMMERCIAL

## 2024-07-17 DIAGNOSIS — N40.1 BPH ASSOCIATED WITH NOCTURIA: ICD-10-CM

## 2024-07-17 DIAGNOSIS — R35.1 BPH ASSOCIATED WITH NOCTURIA: ICD-10-CM

## 2024-07-17 DIAGNOSIS — R97.20 RISING PSA LEVEL: ICD-10-CM

## 2024-07-17 DIAGNOSIS — N40.2 PROSTATE NODULE: ICD-10-CM

## 2024-07-17 PROCEDURE — A9585 GADOBUTROL INJECTION: HCPCS | Mod: JZ | Performed by: RADIOLOGY

## 2024-07-17 PROCEDURE — 72197 MRI PELVIS W/O & W/DYE: CPT | Mod: GC | Performed by: RADIOLOGY

## 2024-07-17 RX ORDER — GADOBUTROL 604.72 MG/ML
10 INJECTION INTRAVENOUS ONCE
Status: COMPLETED | OUTPATIENT
Start: 2024-07-17 | End: 2024-07-17

## 2024-07-17 RX ADMIN — GADOBUTROL 10 ML: 604.72 INJECTION INTRAVENOUS at 15:20

## 2024-07-17 NOTE — DISCHARGE INSTRUCTIONS
MRI Contrast Discharge Instructions    The IV contrast you received today will pass out of your body in your  urine. This will happen in the next 24 hours. You will not feel this process.  Your urine will not change color.    Drink at least 4 extra glasses of water or juice today (unless your doctor  has restricted your fluids). This reduces the stress on your kidneys.  You may take your regular medicines.    If you are on dialysis: It is best to have dialysis today.    If you have a reaction: Most reactions happen right away. If you have  any new symptoms after leaving the hospital (such as hives or swelling),  call your hospital at the correct number below. Or call your family doctor.  If you have breathing distress or wheezing, call 911.    Special instructions: ***    I have read and understand the above information.    Signature:______________________________________ Date:___________    Staff:__________________________________________ Date:___________     Time:__________    [place] [facility] Departments:    ___[place]: [phone]  ___[facility]: [phone]  ___[place]: [phone] ___[facility]: [phone]  ___[facility]: [phone]  ___[facility]: [phone]  ___[phone]  ___[facility]: [phone]  ___[phone]

## 2024-09-25 ENCOUNTER — APPOINTMENT (OUTPATIENT)
Dept: URBAN - METROPOLITAN AREA CLINIC 256 | Age: 79
Setting detail: DERMATOLOGY
End: 2024-09-26

## 2024-09-25 VITALS — HEIGHT: 69 IN | WEIGHT: 220 LBS

## 2024-09-25 DIAGNOSIS — L57.8 OTHER SKIN CHANGES DUE TO CHRONIC EXPOSURE TO NONIONIZING RADIATION: ICD-10-CM

## 2024-09-25 DIAGNOSIS — L40.0 PSORIASIS VULGARIS: ICD-10-CM

## 2024-09-25 DIAGNOSIS — D18.0 HEMANGIOMA: ICD-10-CM

## 2024-09-25 DIAGNOSIS — L82.1 OTHER SEBORRHEIC KERATOSIS: ICD-10-CM

## 2024-09-25 DIAGNOSIS — D22 MELANOCYTIC NEVI: ICD-10-CM

## 2024-09-25 DIAGNOSIS — L82.0 INFLAMED SEBORRHEIC KERATOSIS: ICD-10-CM

## 2024-09-25 DIAGNOSIS — L71.8 OTHER ROSACEA: ICD-10-CM

## 2024-09-25 DIAGNOSIS — Z71.89 OTHER SPECIFIED COUNSELING: ICD-10-CM

## 2024-09-25 PROBLEM — D22.72 MELANOCYTIC NEVI OF LEFT LOWER LIMB, INCLUDING HIP: Status: ACTIVE | Noted: 2024-09-25

## 2024-09-25 PROBLEM — D22.5 MELANOCYTIC NEVI OF TRUNK: Status: ACTIVE | Noted: 2024-09-25

## 2024-09-25 PROBLEM — D18.01 HEMANGIOMA OF SKIN AND SUBCUTANEOUS TISSUE: Status: ACTIVE | Noted: 2024-09-25

## 2024-09-25 PROBLEM — D22.39 MELANOCYTIC NEVI OF OTHER PARTS OF FACE: Status: ACTIVE | Noted: 2024-09-25

## 2024-09-25 PROBLEM — D22.61 MELANOCYTIC NEVI OF RIGHT UPPER LIMB, INCLUDING SHOULDER: Status: ACTIVE | Noted: 2024-09-25

## 2024-09-25 PROBLEM — D22.62 MELANOCYTIC NEVI OF LEFT UPPER LIMB, INCLUDING SHOULDER: Status: ACTIVE | Noted: 2024-09-25

## 2024-09-25 PROBLEM — D22.71 MELANOCYTIC NEVI OF RIGHT LOWER LIMB, INCLUDING HIP: Status: ACTIVE | Noted: 2024-09-25

## 2024-09-25 PROCEDURE — OTHER LIQUID NITROGEN: OTHER

## 2024-09-25 PROCEDURE — OTHER COUNSELING: OTHER

## 2024-09-25 PROCEDURE — OTHER PATIENT SPECIFIC COUNSELING: OTHER

## 2024-09-25 PROCEDURE — 99213 OFFICE O/P EST LOW 20 MIN: CPT | Mod: 25

## 2024-09-25 PROCEDURE — OTHER MIPS QUALITY: OTHER

## 2024-09-25 PROCEDURE — 17110 DESTRUCT B9 LESION 1-14: CPT

## 2024-09-25 ASSESSMENT — LOCATION SIMPLE DESCRIPTION DERM
LOCATION SIMPLE: ABDOMEN
LOCATION SIMPLE: LEFT CHEEK
LOCATION SIMPLE: LEFT THIGH
LOCATION SIMPLE: RIGHT AXILLARY VAULT
LOCATION SIMPLE: LEFT POSTERIOR THIGH
LOCATION SIMPLE: LEFT UPPER ARM
LOCATION SIMPLE: LEFT FOREARM
LOCATION SIMPLE: RIGHT THIGH
LOCATION SIMPLE: LEFT LOWER BACK
LOCATION SIMPLE: RIGHT POSTERIOR THIGH
LOCATION SIMPLE: CHEST
LOCATION SIMPLE: RIGHT UPPER ARM
LOCATION SIMPLE: RIGHT FOREARM

## 2024-09-25 ASSESSMENT — LOCATION DETAILED DESCRIPTION DERM
LOCATION DETAILED: LEFT ANTERIOR PROXIMAL THIGH
LOCATION DETAILED: LEFT INFERIOR MEDIAL MIDBACK
LOCATION DETAILED: LEFT INFERIOR CENTRAL MALAR CHEEK
LOCATION DETAILED: STERNUM
LOCATION DETAILED: RIGHT ANTERIOR DISTAL THIGH
LOCATION DETAILED: RIGHT VENTRAL DISTAL FOREARM
LOCATION DETAILED: RIGHT DISTAL POSTERIOR THIGH
LOCATION DETAILED: LEFT LATERAL ABDOMEN
LOCATION DETAILED: LEFT DISTAL POSTERIOR UPPER ARM
LOCATION DETAILED: RIGHT AXILLARY VAULT
LOCATION DETAILED: RIGHT DISTAL POSTERIOR UPPER ARM
LOCATION DETAILED: LEFT VENTRAL PROXIMAL FOREARM
LOCATION DETAILED: LEFT DISTAL POSTERIOR THIGH

## 2024-09-25 ASSESSMENT — LOCATION ZONE DERM
LOCATION ZONE: FACE
LOCATION ZONE: LEG
LOCATION ZONE: TRUNK
LOCATION ZONE: AXILLAE
LOCATION ZONE: ARM

## 2024-09-25 NOTE — PROCEDURE: PATIENT SPECIFIC COUNSELING
Explained to the patient that there is a possibility the site will persist, if it does advised him to RTC.
Detail Level: Zone

## 2024-09-25 NOTE — PROCEDURE: LIQUID NITROGEN
Render Post-Care Instructions In Note?: no
Number Of Freeze-Thaw Cycles: 2 freeze-thaw cycles
Show Aperture Variable?: Yes
Spray Paint Text: The liquid nitrogen was applied to the skin utilizing a spray paint frosting technique.
Medical Necessity Clause: This procedure was medically necessary because the lesions that were treated were:
Consent: The patient's verbal consent was obtained including but not limited to risks of crusting, scabbing, blistering, scarring, darker or lighter pigmentary change, recurrence, incomplete removal and infection.
Medical Necessity Information: It is in your best interest to select a reason for this procedure from the list below. All of these items fulfill various CMS LCD requirements except the new and changing color options.
Detail Level: Simple
Post-Care Instructions: I reviewed with the patient in detail post-care instructions. Patient is to wear sunprotection, and avoid picking at any of the treated lesions. Pt may apply Vaseline to crusted or scabbing areas.
Duration Of Freeze Thaw-Cycle (Seconds): 5-10

## 2024-09-25 NOTE — HPI: FULL BODY SKIN EXAMINATION
What Is The Reason For Today's Visit?: Full Body Skin Examination
What Is The Reason For Today's Visit? (Being Monitored For X): concerning skin lesions on an annual basis
Additional History: [place] in [place] \\nSkin tag complaints

## 2025-05-28 ENCOUNTER — RESULTS FOLLOW-UP (OUTPATIENT)
Dept: SURGERY | Facility: CLINIC | Age: 80
End: 2025-05-28

## 2025-05-28 ENCOUNTER — OFFICE VISIT (OUTPATIENT)
Dept: UROLOGY | Facility: CLINIC | Age: 80
End: 2025-05-28
Payer: COMMERCIAL

## 2025-05-28 VITALS
HEIGHT: 69 IN | HEART RATE: 84 BPM | WEIGHT: 213 LBS | SYSTOLIC BLOOD PRESSURE: 147 MMHG | OXYGEN SATURATION: 98 % | DIASTOLIC BLOOD PRESSURE: 76 MMHG | BODY MASS INDEX: 31.55 KG/M2

## 2025-05-28 DIAGNOSIS — F40.240 CLAUSTROPHOBIA: ICD-10-CM

## 2025-05-28 DIAGNOSIS — N52.9 ERECTILE DYSFUNCTION, UNSPECIFIED ERECTILE DYSFUNCTION TYPE: ICD-10-CM

## 2025-05-28 DIAGNOSIS — R39.15 URINARY URGENCY: ICD-10-CM

## 2025-05-28 DIAGNOSIS — R35.1 BPH ASSOCIATED WITH NOCTURIA: ICD-10-CM

## 2025-05-28 DIAGNOSIS — N40.2 PROSTATE NODULE: ICD-10-CM

## 2025-05-28 DIAGNOSIS — N18.2 CKD (CHRONIC KIDNEY DISEASE) STAGE 2, GFR 60-89 ML/MIN: ICD-10-CM

## 2025-05-28 DIAGNOSIS — R97.20 RISING PSA LEVEL: Primary | ICD-10-CM

## 2025-05-28 DIAGNOSIS — R32 URINARY INCONTINENCE, UNSPECIFIED TYPE: ICD-10-CM

## 2025-05-28 DIAGNOSIS — N40.1 BPH ASSOCIATED WITH NOCTURIA: ICD-10-CM

## 2025-05-28 LAB
ALBUMIN UR-MCNC: 30 MG/DL
APPEARANCE UR: CLEAR
BILIRUB UR QL STRIP: NEGATIVE
COLOR UR AUTO: YELLOW
GLUCOSE UR STRIP-MCNC: NEGATIVE MG/DL
HGB UR QL STRIP: NEGATIVE
KETONES UR STRIP-MCNC: NEGATIVE MG/DL
LEUKOCYTE ESTERASE UR QL STRIP: NEGATIVE
NITRATE UR QL: NEGATIVE
PH UR STRIP: 5.5 [PH] (ref 5–7)
PSA SERPL-MCNC: 20.4 UG/L (ref 0–4)
RESIDUAL VOLUME (RV) (EXTERNAL): NORMAL
SP GR UR STRIP: 1.02 (ref 1–1.03)
UROBILINOGEN UR STRIP-ACNC: 0.2 E.U./DL

## 2025-05-28 PROCEDURE — 84153 ASSAY OF PSA TOTAL: CPT | Performed by: STUDENT IN AN ORGANIZED HEALTH CARE EDUCATION/TRAINING PROGRAM

## 2025-05-28 PROCEDURE — 99214 OFFICE O/P EST MOD 30 MIN: CPT | Mod: 25 | Performed by: STUDENT IN AN ORGANIZED HEALTH CARE EDUCATION/TRAINING PROGRAM

## 2025-05-28 PROCEDURE — 51798 US URINE CAPACITY MEASURE: CPT | Performed by: STUDENT IN AN ORGANIZED HEALTH CARE EDUCATION/TRAINING PROGRAM

## 2025-05-28 PROCEDURE — 36415 COLL VENOUS BLD VENIPUNCTURE: CPT | Performed by: STUDENT IN AN ORGANIZED HEALTH CARE EDUCATION/TRAINING PROGRAM

## 2025-05-28 PROCEDURE — 3078F DIAST BP <80 MM HG: CPT | Performed by: STUDENT IN AN ORGANIZED HEALTH CARE EDUCATION/TRAINING PROGRAM

## 2025-05-28 PROCEDURE — 81003 URINALYSIS AUTO W/O SCOPE: CPT | Mod: QW | Performed by: STUDENT IN AN ORGANIZED HEALTH CARE EDUCATION/TRAINING PROGRAM

## 2025-05-28 PROCEDURE — 1126F AMNT PAIN NOTED NONE PRSNT: CPT | Performed by: STUDENT IN AN ORGANIZED HEALTH CARE EDUCATION/TRAINING PROGRAM

## 2025-05-28 PROCEDURE — 3077F SYST BP >= 140 MM HG: CPT | Performed by: STUDENT IN AN ORGANIZED HEALTH CARE EDUCATION/TRAINING PROGRAM

## 2025-05-28 RX ORDER — DIAZEPAM 5 MG/1
5 TABLET ORAL EVERY 6 HOURS PRN
Qty: 1 TABLET | Refills: 0 | Status: SHIPPED | OUTPATIENT
Start: 2025-05-28

## 2025-05-28 RX ORDER — OXYBUTYNIN CHLORIDE 5 MG/1
5 TABLET, EXTENDED RELEASE ORAL DAILY
Qty: 90 TABLET | Refills: 3 | Status: SHIPPED | OUTPATIENT
Start: 2025-05-28

## 2025-05-28 RX ORDER — TADALAFIL 5 MG/1
5 TABLET ORAL EVERY 24 HOURS
Qty: 90 TABLET | Refills: 3 | Status: SHIPPED | OUTPATIENT
Start: 2025-05-28

## 2025-05-28 RX ORDER — OXYBUTYNIN CHLORIDE 10 MG/1
10 TABLET, EXTENDED RELEASE ORAL DAILY
Qty: 90 TABLET | Refills: 3 | Status: SHIPPED | OUTPATIENT
Start: 2025-05-28

## 2025-05-28 ASSESSMENT — PAIN SCALES - GENERAL: PAINLEVEL_OUTOF10: NO PAIN (0)

## 2025-05-28 NOTE — NURSING NOTE
Chief Complaint   Patient presents with    Benign Prostatic Hypertrophy    Elevated PSA    Pvr is 17ml done with bladder scan   Ayesha Worley, CMA

## 2025-05-28 NOTE — PROGRESS NOTES
"CHIEF COMPLAINT   Monster Olmedo who is a 79 year old male returns today for follow-up of BPH with nocturia s/p Greenlight PVP 8/17/2022, ED, urinary incontinence, rising PSA, prostate nodule    HPI   Monster Olmedo is a 79 year old male returns today for follow-up of BPH with nocturia s/p Greenlight PVP 8/17/2022, ED, urinary incontinence, rising PSA, prostate nodule    Last seen a year ago. I had recommend a prostate MRI for rising PSA which returned as 100 g PIRADS 2    We had started tadalafil 5 mg daily for ED and doesn't seem to be working well unfortunately. He has tried sildenafil and vardenafil in the past without much success either. He is not able to have penetrative intercourse and is not having much tumescence either. Has normal libido    For urinary incontinence we continued oxybutinin 10 mg daily without side effects. Alfuzosin was stopped. Typically nocturia 1-2x but occasionally has nights with worse issues. Still wears depends during day for protection and typically gets wet, at night almost never leaks. Continues to have strong urge to urinate especially when standing    AUA symptom score 6-4-2-5-2-1-2 = 18 qol unhappy        PHYSICAL EXAM  Patient is a 79 year old  male   Vitals: Blood pressure (!) 147/76, pulse 84, height 1.753 m (5' 9\"), weight 96.6 kg (213 lb), SpO2 98%.  Body mass index is 31.45 kg/m .  General Appearance Adult:   Alert, no acute distress, oriented  HENT: throat/mouth:normal, good dentition  Lungs: no respiratory distress, or pursed lip breathing  Heart: No obvious jugular venous distension present  Abdomen: nondistended  Musculoskeltal: extremities normal, no peripheral edema  Skin: no suspicious lesions or rashes  Neuro: Alert, oriented, speech and mentation normal  Psych: affect and mood normal  Gait: Normal  : deferred    All pertinent imaging reviewed:    Mri prostate 7/17/2024    Size: 4.4 x 5.7 x 4.4 cm, 100 and grams     IMPRESSION:  1. Based on the most " suspicious abnormality, this exam is  characterized as PIRADS 2 - Clinically significant cancer is unlikely  to be present.  2. No suspicious adenopathy or evidence of pelvic  metastases.       Component      Latest Ref Rng 5/28/2025  10:30 AM 5/28/2025  10:47 AM   Color Urine      Colorless, Straw, Light Yellow, Yellow   Yellow    Appearance Urine      Clear   Clear    Glucose Urine      Negative mg/dL  Negative    Bilirubin Urine      Negative   Negative    Ketones Urine      Negative mg/dL  Negative    Specific Gravity Urine      1.003 - 1.035   1.020    Blood Urine      Negative   Negative    pH Urine      5.0 - 7.0   5.5    Protein Albumin Urine      Negative mg/dL  30 !    Urobilinogen Urine      0.2, 1.0 E.U./dL  0.2    Nitrite Urine      Negative   Negative    Leukocyte Esterase Urine      Negative   Negative    PSA      0.00 - 4.00 ug/L 20.40 (H)        Legend:  (H) High  ! Abnormal    PVR 17 ml    ASSESSMENT and PLAN   79 year old male returns today for follow-up of BPH with nocturia s/p Greenlight PVP 8/17/2022, ED, urinary incontinence, rising PSA, prostate nodule    Rising PSA: very concerning rise to over 20 ng/ml. Will repeat prostate biopsy and we will plan to do a prostate biopsy regardless of results (random if no target, and uronav if there is a target). Defer exam to time of biopsy    Discussed risks of prostate biopsy including bleeding (hematuria, hematochezia, hematospermia), infection (urinary tract infection, 5% risk of sepsis), pain.    Trace proteinuria, persistent from prior. 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 Will defer further workup to primary, consider nephrology consult if worsening    Re: erectile dysfunction, we also discussed the entire gamut of erectile dysfunction treatments ranging from different PDEVI (has failed everything but hasn't tried avanafil), ICI (he is having trouble getting over the mental block of injecting penis with needle), GRAYSON (he has tried in the past), or  referral for IPP. He wants to defer further treatment until he talks to his wife. Will continue tadalafil 5 mg daily    Re: urinary incontinence, seems urge related. Will titrate oxybutinin up to 15 mg XL (rx drug management)      - go to oxybutinin 15 mg xl daily (10 mg xl + 5 mg xl)  - continue tadalafil 5 mg daily  - Get prostate MRI and schedule prostate biopsy a few weeks after    Bebeto Cardoza MD   [facility] Urology  [facility] Phone: [phone]     The patient is a 25y Female complaining of sore throat.

## 2025-05-28 NOTE — PATIENT INSTRUCTIONS
- go to oxybutinin 15 mg xl daily (10 mg xl + 5 mg xl)    - Get prostate MRI and schedule prostate biopsy a few weeks after

## 2025-05-28 NOTE — Clinical Note
"5/28/2025       RE: Monster Olmedo  [address]  [place] [address]     Dear Colleague,    Thank you for referring your patient, Monster Olmedo, to the [facility] at [facility]. Please see a copy of my visit note below.    CHIEF COMPLAINT   Monster Olmedo who is a 79 year old male returns today for follow-up of BPH with nocturia s/p Greenlight PVP 8/17/2022, ED, urinary incontinence, rising PSA, prostate nodule    HPI   Monster Olmedo is a 79 year old male returns today for follow-up of BPH with nocturia s/p Greenlight PVP 8/17/2022, ED, urinary incontinence, rising PSA, prostate nodule    Last seen a year ago. I had recommend a prostate MRI for rising PSA which returned as 100 g PIRADS 2    We had started tadalafil 5 mg daily for ED  For urinary incontinence we continued oxybutinin 10 mg daily. Alfuzosin was stopped    PHYSICAL EXAM  Patient is a 79 year old  male   Vitals: Blood pressure (!) 147/76, pulse 84, height 1.753 m (5' 9\"), weight 96.6 kg (213 lb), SpO2 98%.  Body mass index is 31.45 kg/m .  General Appearance Adult:   Alert, no acute distress, oriented  HENT: throat/mouth:normal, good dentition  Lungs: no respiratory distress, or pursed lip breathing  Heart: No obvious jugular venous distension present  Abdomen: nondistended  Musculoskeltal: extremities normal, no peripheral edema  Skin: no suspicious lesions or rashes  Neuro: Alert, oriented, speech and mentation normal  Psych: affect and mood normal  Gait: Normal  : {NATALIE EXAM MALE GENITAL:615012}    All pertinent imaging reviewed:    Mri prostate 7/17/2024    Size: 4.4 x 5.7 x 4.4 cm, 100 and grams     IMPRESSION:  1. Based on the most suspicious abnormality, this exam is  characterized as PIRADS 2 - Clinically significant cancer is unlikely  to be present.  2. No suspicious adenopathy or evidence of pelvic  metastases.       Component      Latest Ref Rng " 5/28/2025  10:30 AM 5/28/2025  10:47 AM   Color Urine      Colorless, Straw, Light Yellow, Yellow   Yellow    Appearance Urine      Clear   Clear    Glucose Urine      Negative mg/dL  Negative    Bilirubin Urine      Negative   Negative    Ketones Urine      Negative mg/dL  Negative    Specific Gravity Urine      1.003 - 1.035   1.020    Blood Urine      Negative   Negative    pH Urine      5.0 - 7.0   5.5    Protein Albumin Urine      Negative mg/dL  30 !    Urobilinogen Urine      0.2, 1.0 E.U./dL  0.2    Nitrite Urine      Negative   Negative    Leukocyte Esterase Urine      Negative   Negative    PSA      0.00 - 4.00 ug/L 20.40 (H)        Legend:  (H) High  ! Abnormal    PVR 17 ml    ASSESSMENT and PLAN  Rising PSA: very concerning rise to over 20 ng/ml. Will repeat prostate biopsy and we will plan to do a prostate biopsy regardless of results (random if no target, and uronav if there is a target)    Discussed risks of prostate biopsy including bleeding (hematuria, hematochezia, hematospermia), infection (urinary tract infection, 5% risk of sepsis), pain.    Trace proteinuria, persistent from prior. 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 Will defer further workup to primary, consider nephrology consult if worsening      Bebeto Cardoza MD   [facility] Urology  [facility] Phone: [phone]      CHIEF COMPLAINT   Monster Olmedo who is a 79 year old male returns today for follow-up of BPH with nocturia s/p Greenlight PVP 8/17/2022, ED, urinary incontinence, rising PSA, prostate nodule    HPI   Monster Olmedo is a 79 year old male returns today for follow-up of BPH with nocturia s/p Greenlight PVP 8/17/2022, ED, urinary incontinence, rising PSA, prostate nodule    Last seen a year ago. I had recommend a prostate MRI for rising PSA which returned as 100 g PIRADS 2    We had started tadalafil 5 mg daily for ED and doesn't seem to be working well unfortunately. He has tried sildenafil and  "vardenafil in the past without much success either. He is not able to have penetrative intercourse and is not having much tumescence either.     For urinary incontinence we continued oxybutinin 10 mg daily without side effects. Alfuzosin was stopped. Typically nocturia 1-2x but occasionally has nights with worse issues. Still wears depends during day for protection and typically gets wet, at night almost never leaks. Continues to have strong urge to urinate especially when standing    AUA symptom score 7-6-4-5-2-1-2 = 18 qol unhappy    PHYSICAL EXAM  Patient is a 79 year old  male   Vitals: Blood pressure (!) 147/76, pulse 84, height 1.753 m (5' 9\"), weight 96.6 kg (213 lb), SpO2 98%.  Body mass index is 31.45 kg/m .  General Appearance Adult:   Alert, no acute distress, oriented  HENT: throat/mouth:normal, good dentition  Lungs: no respiratory distress, or pursed lip breathing  Heart: No obvious jugular venous distension present  Abdomen: nondistended  Musculoskeltal: extremities normal, no peripheral edema  Skin: no suspicious lesions or rashes  Neuro: Alert, oriented, speech and mentation normal  Psych: affect and mood normal  Gait: Normal  : {NATALIE EXAM MALE GENITAL:688597}    All pertinent imaging reviewed:    Mri prostate 7/17/2024    Size: 4.4 x 5.7 x 4.4 cm, 100 and grams     IMPRESSION:  1. Based on the most suspicious abnormality, this exam is  characterized as PIRADS 2 - Clinically significant cancer is unlikely  to be present.  2. No suspicious adenopathy or evidence of pelvic  metastases.       Component      Latest Ref Rng 5/28/2025  10:30 AM 5/28/2025  10:47 AM   Color Urine      Colorless, Straw, Light Yellow, Yellow   Yellow    Appearance Urine      Clear   Clear    Glucose Urine      Negative mg/dL  Negative    Bilirubin Urine      Negative   Negative    Ketones Urine      Negative mg/dL  Negative    Specific Gravity Urine      1.003 - 1.035   1.020    Blood Urine      Negative   Negative    pH " Urine      5.0 - 7.0   5.5    Protein Albumin Urine      Negative mg/dL  30 !    Urobilinogen Urine      0.2, 1.0 E.U./dL  0.2    Nitrite Urine      Negative   Negative    Leukocyte Esterase Urine      Negative   Negative    PSA      0.00 - 4.00 ug/L 20.40 (H)        Legend:  (H) High  ! Abnormal    PVR 17 ml    ASSESSMENT and PLAN  Rising PSA: very concerning rise to over 20 ng/ml. Will repeat prostate biopsy and we will plan to do a prostate biopsy regardless of results (random if no target, and uronav if there is a target)    Discussed risks of prostate biopsy including bleeding (hematuria, hematochezia, hematospermia), infection (urinary tract infection, 5% risk of sepsis), pain.    Trace proteinuria, persistent from prior. 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 Will defer further workup to primary, consider nephrology consult if worsening    Re: erectile dysfunction, we also discussed the entire gamut of erectile dysfunction treatments ranging from different PDEVI (has failed everything but hasn't tried avanafil), ICI (he is having trouble getting over the mental block of injecting penis with needle), GRAYSON (he has tried in the past), or referral for IPP.    Re: urinary incontinence, seems urge related. Will titrate oxybutinin up.     Bebeto Cardoza MD   [facility]rology  [facility] Phone: [phone]        Again, thank you for allowing me to participate in the care of your patient.      Sincerely,    Bebeto Cardoza MD

## 2025-05-29 ENCOUNTER — TELEPHONE (OUTPATIENT)
Dept: UROLOGY | Facility: CLINIC | Age: 80
End: 2025-05-29
Payer: COMMERCIAL

## 2025-05-29 NOTE — TELEPHONE ENCOUNTER
[facility] Call Center    Phone Message    May a detailed message be left on voicemail: yes     Reason for Call: Other: Patient called to request Dr. Cardoza to submit MRI orders through [employer] to verify that patient's insurance company can and will cover the MRI prior to scheduling. Please call [phone] ([employer], a third-party vendor for [employer]) to submit, and then call back patient to verify.     Action Taken: Message routed to:  Other: [facility]    Travel Screening: Not Applicable

## 2025-07-05 ENCOUNTER — HEALTH MAINTENANCE LETTER (OUTPATIENT)
Age: 80
End: 2025-07-05

## 2025-07-14 ENCOUNTER — TELEPHONE (OUTPATIENT)
Dept: UROLOGY | Facility: CLINIC | Age: 80
End: 2025-07-14
Payer: COMMERCIAL

## 2025-07-14 NOTE — TELEPHONE ENCOUNTER
Health Call Center    Phone Message    May a detailed message be left on voicemail: yes     Reason for Call: Pt is calling asking if Dr Cardoza is ok to wait to have his MRI and Bx till end of this year or into next year due to an upcoming surgery in Sept. Please call pt to discuss plan of care.    Action Taken: Other: [facility] - Urology    Travel Screening: Not Applicable     Date of Service:

## 2025-07-18 ENCOUNTER — APPOINTMENT (OUTPATIENT)
Dept: URBAN - METROPOLITAN AREA CLINIC 256 | Age: 80
Setting detail: DERMATOLOGY
End: 2025-07-18

## 2025-07-18 VITALS — WEIGHT: 220 LBS | HEIGHT: 69 IN

## 2025-07-18 DIAGNOSIS — D18.0 HEMANGIOMA: ICD-10-CM

## 2025-07-18 DIAGNOSIS — L40.0 PSORIASIS VULGARIS: ICD-10-CM

## 2025-07-18 DIAGNOSIS — L82.1 OTHER SEBORRHEIC KERATOSIS: ICD-10-CM

## 2025-07-18 DIAGNOSIS — L57.0 ACTINIC KERATOSIS: ICD-10-CM

## 2025-07-18 DIAGNOSIS — Z71.89 OTHER SPECIFIED COUNSELING: ICD-10-CM

## 2025-07-18 DIAGNOSIS — L57.8 OTHER SKIN CHANGES DUE TO CHRONIC EXPOSURE TO NONIONIZING RADIATION: ICD-10-CM

## 2025-07-18 DIAGNOSIS — L72.0 EPIDERMAL CYST: ICD-10-CM

## 2025-07-18 DIAGNOSIS — D22 MELANOCYTIC NEVI: ICD-10-CM

## 2025-07-18 PROBLEM — D18.01 HEMANGIOMA OF SKIN AND SUBCUTANEOUS TISSUE: Status: ACTIVE | Noted: 2025-07-18

## 2025-07-18 PROBLEM — D22.5 MELANOCYTIC NEVI OF TRUNK: Status: ACTIVE | Noted: 2025-07-18

## 2025-07-18 PROCEDURE — OTHER SEPARATE AND IDENTIFIABLE DOCUMENTATION: OTHER

## 2025-07-18 PROCEDURE — OTHER EDUCATIONAL RESOURCES PROVIDED: OTHER

## 2025-07-18 PROCEDURE — 17000 DESTRUCT PREMALG LESION: CPT

## 2025-07-18 PROCEDURE — 99214 OFFICE O/P EST MOD 30 MIN: CPT | Mod: 25

## 2025-07-18 PROCEDURE — OTHER LIQUID NITROGEN: OTHER

## 2025-07-18 PROCEDURE — OTHER PRESCRIPTION: OTHER

## 2025-07-18 PROCEDURE — OTHER PRESCRIPTION MEDICATION MANAGEMENT: OTHER

## 2025-07-18 PROCEDURE — OTHER COUNSELING: OTHER

## 2025-07-18 PROCEDURE — OTHER MIPS QUALITY: OTHER

## 2025-07-18 PROCEDURE — OTHER SUNSCREEN RECOMMENDATIONS: OTHER

## 2025-07-18 RX ORDER — TRIAMCINOLONE ACETONIDE 1 MG/G
CREAM TOPICAL
Qty: 80 | Refills: 0 | Status: ERX | COMMUNITY
Start: 2025-07-18

## 2025-07-18 ASSESSMENT — LOCATION ZONE DERM
LOCATION ZONE: FACE
LOCATION ZONE: TRUNK

## 2025-07-18 ASSESSMENT — LOCATION DETAILED DESCRIPTION DERM
LOCATION DETAILED: RIGHT SUPERIOR FOREHEAD
LOCATION DETAILED: EPIGASTRIC SKIN
LOCATION DETAILED: LEFT LATERAL UPPER BACK
LOCATION DETAILED: SUPERIOR THORACIC SPINE
LOCATION DETAILED: STERNUM
LOCATION DETAILED: SUPERIOR LUMBAR SPINE

## 2025-07-18 ASSESSMENT — LOCATION SIMPLE DESCRIPTION DERM
LOCATION SIMPLE: UPPER BACK
LOCATION SIMPLE: LEFT UPPER BACK
LOCATION SIMPLE: ABDOMEN
LOCATION SIMPLE: RIGHT FOREHEAD
LOCATION SIMPLE: CHEST
LOCATION SIMPLE: LOWER BACK

## 2025-07-22 DIAGNOSIS — Z79.2 PROPHYLACTIC ANTIBIOTIC: Primary | ICD-10-CM

## 2025-07-22 RX ORDER — CIPROFLOXACIN 500 MG/1
TABLET, FILM COATED ORAL
Qty: 6 TABLET | Refills: 0 | Status: SHIPPED | OUTPATIENT
Start: 2025-07-22

## 2025-08-04 ENCOUNTER — ANCILLARY PROCEDURE (OUTPATIENT)
Dept: MRI IMAGING | Facility: CLINIC | Age: 80
End: 2025-08-04
Attending: STUDENT IN AN ORGANIZED HEALTH CARE EDUCATION/TRAINING PROGRAM
Payer: COMMERCIAL

## 2025-08-04 DIAGNOSIS — N40.2 PROSTATE NODULE: ICD-10-CM

## 2025-08-04 DIAGNOSIS — R97.20 RISING PSA LEVEL: ICD-10-CM

## 2025-08-04 PROCEDURE — 255N000002 HC RX 255 OP 636: Performed by: STUDENT IN AN ORGANIZED HEALTH CARE EDUCATION/TRAINING PROGRAM

## 2025-08-04 PROCEDURE — 72197 MRI PELVIS W/O & W/DYE: CPT | Mod: 26 | Performed by: STUDENT IN AN ORGANIZED HEALTH CARE EDUCATION/TRAINING PROGRAM

## 2025-08-04 PROCEDURE — A9585 GADOBUTROL INJECTION: HCPCS | Performed by: STUDENT IN AN ORGANIZED HEALTH CARE EDUCATION/TRAINING PROGRAM

## 2025-08-04 PROCEDURE — 72197 MRI PELVIS W/O & W/DYE: CPT

## 2025-08-04 RX ORDER — GADOBUTROL 604.72 MG/ML
10 INJECTION INTRAVENOUS ONCE
Status: COMPLETED | OUTPATIENT
Start: 2025-08-04 | End: 2025-08-04

## 2025-08-04 RX ADMIN — GADOBUTROL 10 ML: 604.72 INJECTION INTRAVENOUS at 10:48

## 2025-08-06 ENCOUNTER — OFFICE VISIT (OUTPATIENT)
Dept: UROLOGY | Facility: CLINIC | Age: 80
End: 2025-08-06
Payer: COMMERCIAL

## 2025-08-06 VITALS
BODY MASS INDEX: 32.58 KG/M2 | DIASTOLIC BLOOD PRESSURE: 76 MMHG | OXYGEN SATURATION: 99 % | SYSTOLIC BLOOD PRESSURE: 135 MMHG | HEART RATE: 80 BPM | HEIGHT: 69 IN | WEIGHT: 220 LBS

## 2025-08-06 DIAGNOSIS — R97.20 RISING PSA LEVEL: Primary | ICD-10-CM

## 2025-08-06 DIAGNOSIS — N40.2 PROSTATE NODULE: ICD-10-CM

## 2025-08-06 RX ORDER — GENTAMICIN 40 MG/ML
80 INJECTION, SOLUTION INTRAMUSCULAR; INTRAVENOUS ONCE
Status: COMPLETED | OUTPATIENT
Start: 2025-08-06 | End: 2025-08-06

## 2025-08-06 RX ORDER — LIDOCAINE HYDROCHLORIDE 10 MG/ML
16 INJECTION, SOLUTION INFILTRATION; PERINEURAL ONCE
Status: COMPLETED | OUTPATIENT
Start: 2025-08-06 | End: 2025-08-06

## 2025-08-06 RX ADMIN — LIDOCAINE HYDROCHLORIDE 20 ML: 10 INJECTION, SOLUTION INFILTRATION; PERINEURAL at 08:30

## 2025-08-06 RX ADMIN — GENTAMICIN 80 MG: 40 INJECTION, SOLUTION INTRAMUSCULAR; INTRAVENOUS at 08:20

## 2025-08-06 ASSESSMENT — PAIN SCALES - GENERAL: PAINLEVEL_OUTOF10: MODERATE PAIN (4)

## 2025-08-13 ENCOUNTER — TELEPHONE (OUTPATIENT)
Dept: UROLOGY | Facility: CLINIC | Age: 80
End: 2025-08-13

## 2025-08-13 ENCOUNTER — VIRTUAL VISIT (OUTPATIENT)
Dept: UROLOGY | Facility: CLINIC | Age: 80
End: 2025-08-13
Payer: COMMERCIAL

## 2025-08-13 VITALS — WEIGHT: 220 LBS | HEIGHT: 69 IN | BODY MASS INDEX: 32.58 KG/M2

## 2025-08-13 DIAGNOSIS — N40.2 PROSTATE NODULE: Primary | ICD-10-CM

## 2025-08-13 DIAGNOSIS — R32 URINARY INCONTINENCE, UNSPECIFIED TYPE: ICD-10-CM

## 2025-08-13 DIAGNOSIS — R97.20 ELEVATED PROSTATE SPECIFIC ANTIGEN (PSA): ICD-10-CM

## 2025-08-13 DIAGNOSIS — R39.15 URINARY URGENCY: ICD-10-CM

## 2025-08-13 PROCEDURE — 98006 SYNCH AUDIO-VIDEO EST MOD 30: CPT | Performed by: STUDENT IN AN ORGANIZED HEALTH CARE EDUCATION/TRAINING PROGRAM

## 2025-08-13 PROCEDURE — 1126F AMNT PAIN NOTED NONE PRSNT: CPT | Mod: 95 | Performed by: STUDENT IN AN ORGANIZED HEALTH CARE EDUCATION/TRAINING PROGRAM

## 2025-08-13 RX ORDER — OXYBUTYNIN CHLORIDE 5 MG/1
5 TABLET, EXTENDED RELEASE ORAL DAILY
Qty: 90 TABLET | Refills: 3 | Status: SHIPPED | OUTPATIENT
Start: 2025-08-13

## 2025-08-13 RX ORDER — OXYBUTYNIN CHLORIDE 10 MG/1
10 TABLET, EXTENDED RELEASE ORAL DAILY
Qty: 90 TABLET | Refills: 3 | Status: SHIPPED | OUTPATIENT
Start: 2025-08-13

## 2025-08-13 ASSESSMENT — PAIN SCALES - GENERAL: PAINLEVEL_OUTOF10: NO PAIN (0)

## 2025-08-14 ENCOUNTER — TELEPHONE (OUTPATIENT)
Dept: UROLOGY | Facility: CLINIC | Age: 80
End: 2025-08-14
Payer: COMMERCIAL

## (undated) DEVICE — Device

## (undated) DEVICE — SOL NACL 0.9% IRRIG 1000ML BOTTLE 2F7124

## (undated) DEVICE — GLOVE PROTEXIS BLUE W/NEU-THERA 7.0  2D73EB70

## (undated) DEVICE — CATH URETHRAL HOTTER COUDE 22FR 30ML 3-WAY LATEX 6003L22

## (undated) DEVICE — SOL NACL 0.9% INJ 1000ML BAG 2B1324X

## (undated) DEVICE — SOL WATER IRRIG 1000ML BOTTLE 2F7114

## (undated) DEVICE — PAD CHUX UNDERPAD 30X30"

## (undated) DEVICE — GLOVE PROTEXIS MICRO 7.0  2D73PM70

## (undated) DEVICE — SOL NACL 0.9% IRRIG 3000ML BAG 2B7477

## (undated) DEVICE — PACK TUR CUSTOM SBA15RUFSE

## (undated) DEVICE — EVACUATOR BLADDER UROVAC LATEX M0067301250

## (undated) RX ORDER — FENTANYL CITRATE 0.05 MG/ML
INJECTION, SOLUTION INTRAMUSCULAR; INTRAVENOUS
Status: DISPENSED
Start: 2022-08-17

## (undated) RX ORDER — PROPOFOL 10 MG/ML
INJECTION, EMULSION INTRAVENOUS
Status: DISPENSED
Start: 2022-08-17

## (undated) RX ORDER — FUROSEMIDE 10 MG/ML
INJECTION INTRAMUSCULAR; INTRAVENOUS
Status: DISPENSED
Start: 2022-08-17

## (undated) RX ORDER — ONDANSETRON 2 MG/ML
INJECTION INTRAMUSCULAR; INTRAVENOUS
Status: DISPENSED
Start: 2022-08-17

## (undated) RX ORDER — ATROPA BELLADONNA AND OPIUM 16.2; 3 MG/1; MG/1
SUPPOSITORY RECTAL
Status: DISPENSED
Start: 2022-08-17

## (undated) RX ORDER — FENTANYL CITRATE 50 UG/ML
INJECTION, SOLUTION INTRAMUSCULAR; INTRAVENOUS
Status: DISPENSED
Start: 2022-08-17

## (undated) RX ORDER — DEXAMETHASONE SODIUM PHOSPHATE 4 MG/ML
INJECTION, SOLUTION INTRA-ARTICULAR; INTRALESIONAL; INTRAMUSCULAR; INTRAVENOUS; SOFT TISSUE
Status: DISPENSED
Start: 2022-08-17

## (undated) RX ORDER — CEFAZOLIN SODIUM/WATER 2 G/20 ML
SYRINGE (ML) INTRAVENOUS
Status: DISPENSED
Start: 2022-08-17